# Patient Record
Sex: MALE | Race: WHITE | Employment: OTHER | URBAN - METROPOLITAN AREA
[De-identification: names, ages, dates, MRNs, and addresses within clinical notes are randomized per-mention and may not be internally consistent; named-entity substitution may affect disease eponyms.]

---

## 2020-03-19 ENCOUNTER — HOSPITAL ENCOUNTER (EMERGENCY)
Facility: HOSPITAL | Age: 75
Discharge: HOME | End: 2020-03-19
Attending: EMERGENCY MEDICINE
Payer: MEDICARE

## 2020-03-19 VITALS
WEIGHT: 210 LBS | HEIGHT: 72 IN | BODY MASS INDEX: 28.44 KG/M2 | DIASTOLIC BLOOD PRESSURE: 90 MMHG | OXYGEN SATURATION: 97 % | RESPIRATION RATE: 14 BRPM | TEMPERATURE: 98 F | SYSTOLIC BLOOD PRESSURE: 129 MMHG | HEART RATE: 73 BPM

## 2020-03-19 DIAGNOSIS — J06.9 VIRAL URI: Primary | ICD-10-CM

## 2020-03-19 PROCEDURE — 99282 EMERGENCY DEPT VISIT SF MDM: CPT

## 2020-03-19 RX ORDER — ESCITALOPRAM OXALATE 10 MG/1
10 TABLET ORAL DAILY
COMMUNITY

## 2020-03-19 NOTE — ED PROVIDER NOTES
Patient Seen in: [facility] Emergency Department      History   Patient presents with:  Cough/URI    Stated Complaint: SOB, cough, interest in COVID testing, hx of cancer    HPI    72-year-old male with a history of bladder cancer.   That was diagnos nonfocal       MDM       Patient made aware that he does not meet IDPH criteria for corona virus testing at this time. He will return at the for sign of worsening such as shortness of breath.           Disposition and Plan     Clinical Impression:  [facility]

## 2021-02-10 PROBLEM — D03.39 MELANOMA IN SITU OF NOSE (HCC): Status: ACTIVE | Noted: 2021-02-10

## 2022-11-09 ENCOUNTER — TELEMEDICINE (OUTPATIENT)
Dept: FAMILY MEDICINE CLINIC | Facility: CLINIC | Age: 77
End: 2022-11-09
Payer: MEDICARE

## 2022-11-09 DIAGNOSIS — J06.9 VIRAL URI WITH COUGH: Primary | ICD-10-CM

## 2022-11-09 PROBLEM — D69.2 PURPURA: Status: ACTIVE | Noted: 2022-11-09

## 2022-11-09 PROBLEM — J41.0 SMOKERS' COUGH (HCC): Chronic | Status: ACTIVE | Noted: 2022-11-09

## 2022-11-09 PROBLEM — D69.2 PURPURA (HCC): Status: ACTIVE | Noted: 2022-11-09

## 2022-11-09 PROCEDURE — 99203 OFFICE O/P NEW LOW 30 MIN: CPT | Performed by: STUDENT IN AN ORGANIZED HEALTH CARE EDUCATION/TRAINING PROGRAM

## 2022-11-09 RX ORDER — AZELASTINE 1 MG/ML
2 SPRAY, METERED NASAL NIGHTLY PRN
Qty: 30 ML | Refills: 0 | Status: SHIPPED | OUTPATIENT
Start: 2022-11-09

## 2022-11-09 RX ORDER — FLUTICASONE PROPIONATE 50 MCG
2 SPRAY, SUSPENSION (ML) NASAL DAILY PRN
Qty: 16 G | Refills: 0 | Status: SHIPPED | OUTPATIENT
Start: 2022-11-09

## 2022-11-09 RX ORDER — BENZONATATE 200 MG/1
200 CAPSULE ORAL 3 TIMES DAILY PRN
Qty: 30 CAPSULE | Refills: 0 | Status: SHIPPED | OUTPATIENT
Start: 2022-11-09

## 2022-11-10 ENCOUNTER — LAB ENCOUNTER (OUTPATIENT)
Dept: LAB | Age: 77
End: 2022-11-10
Attending: STUDENT IN AN ORGANIZED HEALTH CARE EDUCATION/TRAINING PROGRAM
Payer: MEDICARE

## 2022-11-10 DIAGNOSIS — J06.9 VIRAL URI WITH COUGH: ICD-10-CM

## 2022-11-11 LAB — SARS-COV-2 RNA RESP QL NAA+PROBE: NOT DETECTED

## 2022-11-28 ENCOUNTER — OFFICE VISIT (OUTPATIENT)
Dept: FAMILY MEDICINE CLINIC | Facility: CLINIC | Age: 77
End: 2022-11-28
Payer: MEDICARE

## 2022-11-28 VITALS
BODY MASS INDEX: 26.68 KG/M2 | SYSTOLIC BLOOD PRESSURE: 104 MMHG | TEMPERATURE: 98 F | WEIGHT: 197 LBS | HEART RATE: 88 BPM | DIASTOLIC BLOOD PRESSURE: 70 MMHG | HEIGHT: 72 IN | RESPIRATION RATE: 16 BRPM | OXYGEN SATURATION: 98 %

## 2022-11-28 DIAGNOSIS — J18.9 PULMONARY INFECTION: Primary | ICD-10-CM

## 2022-11-28 DIAGNOSIS — F41.9 ANXIETY AND DEPRESSION: ICD-10-CM

## 2022-11-28 DIAGNOSIS — F32.A ANXIETY AND DEPRESSION: ICD-10-CM

## 2022-11-28 PROCEDURE — 99213 OFFICE O/P EST LOW 20 MIN: CPT | Performed by: STUDENT IN AN ORGANIZED HEALTH CARE EDUCATION/TRAINING PROGRAM

## 2022-11-28 PROCEDURE — 3008F BODY MASS INDEX DOCD: CPT | Performed by: STUDENT IN AN ORGANIZED HEALTH CARE EDUCATION/TRAINING PROGRAM

## 2022-11-28 PROCEDURE — 3078F DIAST BP <80 MM HG: CPT | Performed by: STUDENT IN AN ORGANIZED HEALTH CARE EDUCATION/TRAINING PROGRAM

## 2022-11-28 PROCEDURE — 3074F SYST BP LT 130 MM HG: CPT | Performed by: STUDENT IN AN ORGANIZED HEALTH CARE EDUCATION/TRAINING PROGRAM

## 2022-11-28 RX ORDER — AMOXICILLIN AND CLAVULANATE POTASSIUM 875; 125 MG/1; MG/1
1 TABLET, FILM COATED ORAL 2 TIMES DAILY
Qty: 14 TABLET | Refills: 0 | Status: SHIPPED | OUTPATIENT
Start: 2022-11-28 | End: 2022-12-05

## 2022-11-28 RX ORDER — AZITHROMYCIN 250 MG/1
TABLET, FILM COATED ORAL
Qty: 6 TABLET | Refills: 0 | Status: SHIPPED | OUTPATIENT
Start: 2022-11-28 | End: 2022-12-03

## 2022-11-28 RX ORDER — VENLAFAXINE 75 MG/1
75 TABLET ORAL 2 TIMES DAILY
Qty: 180 TABLET | Refills: 1 | Status: SHIPPED | OUTPATIENT
Start: 2022-11-28

## 2022-12-01 ENCOUNTER — TELEPHONE (OUTPATIENT)
Dept: FAMILY MEDICINE CLINIC | Facility: CLINIC | Age: 77
End: 2022-12-01

## 2022-12-01 DIAGNOSIS — J06.9 VIRAL URI WITH COUGH: Primary | ICD-10-CM

## 2022-12-01 RX ORDER — AZELASTINE 1 MG/ML
2 SPRAY, METERED NASAL NIGHTLY PRN
Qty: 30 ML | Refills: 0 | Status: SHIPPED | OUTPATIENT
Start: 2022-12-01

## 2022-12-01 RX ORDER — FLUTICASONE PROPIONATE 50 MCG
2 SPRAY, SUSPENSION (ML) NASAL DAILY PRN
Qty: 16 G | Refills: 0 | Status: SHIPPED | OUTPATIENT
Start: 2022-12-01

## 2022-12-01 NOTE — TELEPHONE ENCOUNTER
Received fax from pharmacy for refill of flonase. Flonase refilled.      Ricardo Fonseca MD, 12/01/22, 10:09 AM

## 2022-12-20 DIAGNOSIS — R09.81 NASAL CONGESTION: Primary | ICD-10-CM

## 2022-12-20 RX ORDER — FLUTICASONE PROPIONATE 50 MCG
2 SPRAY, SUSPENSION (ML) NASAL DAILY PRN
Qty: 16 G | Refills: 0 | Status: SHIPPED | OUTPATIENT
Start: 2022-12-20

## 2022-12-20 RX ORDER — AZELASTINE 1 MG/ML
2 SPRAY, METERED NASAL NIGHTLY PRN
Qty: 30 ML | Refills: 0 | Status: SHIPPED | OUTPATIENT
Start: 2022-12-20

## 2022-12-20 NOTE — PROGRESS NOTES
Received fax from [facility] requesting refill of flonase and azelastine, medications refilled.      Guerrero Trejo MD, 12/20/22, 2:06 PM

## 2022-12-22 PROBLEM — R91.8 PULMONARY NODULES: Status: ACTIVE | Noted: 2020-06-01

## 2022-12-22 PROBLEM — M17.11 OSTEOARTHRITIS OF RIGHT KNEE: Status: ACTIVE | Noted: 2021-05-18

## 2022-12-22 PROBLEM — C67.5 MALIGNANT NEOPLASM OF URINARY BLADDER NECK (HCC): Status: ACTIVE | Noted: 2018-03-14

## 2022-12-22 PROBLEM — N20.0 NEPHROLITHIASIS: Status: ACTIVE | Noted: 2018-03-19

## 2022-12-22 PROBLEM — T43.225A SELECTIVE SEROTONIN REUPTAKE INHIBITOR (SSRI) DISCONTINUATION SYNDROME: Status: ACTIVE | Noted: 2020-06-25

## 2022-12-22 PROBLEM — T43.205A SELECTIVE SEROTONIN REUPTAKE INHIBITOR (SSRI) DISCONTINUATION SYNDROME: Status: ACTIVE | Noted: 2020-06-25

## 2022-12-22 PROBLEM — M19.90 OSTEOARTHROSIS: Status: ACTIVE | Noted: 2018-03-19

## 2022-12-22 PROBLEM — F41.9 ANXIETY: Status: ACTIVE | Noted: 2018-02-22

## 2023-06-12 ENCOUNTER — OFFICE VISIT (OUTPATIENT)
Dept: FAMILY MEDICINE CLINIC | Facility: CLINIC | Age: 78
End: 2023-06-12
Payer: MEDICARE

## 2023-06-12 VITALS
WEIGHT: 195 LBS | OXYGEN SATURATION: 97 % | RESPIRATION RATE: 20 BRPM | SYSTOLIC BLOOD PRESSURE: 130 MMHG | HEIGHT: 72 IN | BODY MASS INDEX: 26.41 KG/M2 | HEART RATE: 76 BPM | DIASTOLIC BLOOD PRESSURE: 80 MMHG

## 2023-06-12 DIAGNOSIS — R05.1 ACUTE COUGH: Primary | ICD-10-CM

## 2023-06-12 DIAGNOSIS — J18.9 PULMONARY INFECTION: ICD-10-CM

## 2023-06-12 PROCEDURE — 1170F FXNL STATUS ASSESSED: CPT | Performed by: STUDENT IN AN ORGANIZED HEALTH CARE EDUCATION/TRAINING PROGRAM

## 2023-06-12 PROCEDURE — 1159F MED LIST DOCD IN RCRD: CPT | Performed by: STUDENT IN AN ORGANIZED HEALTH CARE EDUCATION/TRAINING PROGRAM

## 2023-06-12 PROCEDURE — 3079F DIAST BP 80-89 MM HG: CPT | Performed by: STUDENT IN AN ORGANIZED HEALTH CARE EDUCATION/TRAINING PROGRAM

## 2023-06-12 PROCEDURE — 1160F RVW MEDS BY RX/DR IN RCRD: CPT | Performed by: STUDENT IN AN ORGANIZED HEALTH CARE EDUCATION/TRAINING PROGRAM

## 2023-06-12 PROCEDURE — 87637 SARSCOV2&INF A&B&RSV AMP PRB: CPT | Performed by: STUDENT IN AN ORGANIZED HEALTH CARE EDUCATION/TRAINING PROGRAM

## 2023-06-12 PROCEDURE — 99213 OFFICE O/P EST LOW 20 MIN: CPT | Performed by: STUDENT IN AN ORGANIZED HEALTH CARE EDUCATION/TRAINING PROGRAM

## 2023-06-12 PROCEDURE — 3075F SYST BP GE 130 - 139MM HG: CPT | Performed by: STUDENT IN AN ORGANIZED HEALTH CARE EDUCATION/TRAINING PROGRAM

## 2023-06-12 PROCEDURE — 3008F BODY MASS INDEX DOCD: CPT | Performed by: STUDENT IN AN ORGANIZED HEALTH CARE EDUCATION/TRAINING PROGRAM

## 2023-06-12 RX ORDER — ALBUTEROL SULFATE 90 UG/1
2 AEROSOL, METERED RESPIRATORY (INHALATION) EVERY 6 HOURS PRN
Qty: 18 G | Refills: 0 | Status: SHIPPED | OUTPATIENT
Start: 2023-06-12

## 2023-06-12 RX ORDER — AZITHROMYCIN 250 MG/1
TABLET, FILM COATED ORAL
Qty: 6 TABLET | Refills: 0 | Status: SHIPPED | OUTPATIENT
Start: 2023-06-12 | End: 2023-06-17

## 2023-06-13 LAB
FLUAV + FLUBV RNA SPEC NAA+PROBE: NEGATIVE
FLUAV + FLUBV RNA SPEC NAA+PROBE: NEGATIVE
RSV RNA SPEC NAA+PROBE: NEGATIVE
SARS-COV-2 RNA RESP QL NAA+PROBE: NOT DETECTED

## 2023-06-21 ENCOUNTER — TELEPHONE (OUTPATIENT)
Dept: FAMILY MEDICINE CLINIC | Facility: CLINIC | Age: 78
End: 2023-06-21

## 2023-06-21 NOTE — TELEPHONE ENCOUNTER
Cough has gotten better but is still coughing,' he is using nasal spray. Nose and cough discharge not green anymore but still coughing up phlegm. Asking if he can get the Zpak that was discussed at appt to [facility].    Pt aware Dr. Han Reyes not in office today.

## 2023-06-21 NOTE — TELEPHONE ENCOUNTER
Per pharm already p/u abx     Pt denies SOB, denies fever   But feels about the same   [facility]

## 2023-06-22 ENCOUNTER — TELEPHONE (OUTPATIENT)
Dept: FAMILY MEDICINE CLINIC | Facility: CLINIC | Age: 78
End: 2023-06-22

## 2023-06-22 ENCOUNTER — HOSPITAL ENCOUNTER (OUTPATIENT)
Dept: GENERAL RADIOLOGY | Facility: HOSPITAL | Age: 78
Discharge: HOME | End: 2023-06-22
Attending: STUDENT IN AN ORGANIZED HEALTH CARE EDUCATION/TRAINING PROGRAM
Payer: MEDICARE

## 2023-06-22 ENCOUNTER — OFFICE VISIT (OUTPATIENT)
Dept: FAMILY MEDICINE CLINIC | Facility: CLINIC | Age: 78
End: 2023-06-22
Payer: MEDICARE

## 2023-06-22 VITALS
DIASTOLIC BLOOD PRESSURE: 80 MMHG | BODY MASS INDEX: 27.22 KG/M2 | HEART RATE: 65 BPM | RESPIRATION RATE: 19 BRPM | SYSTOLIC BLOOD PRESSURE: 128 MMHG | WEIGHT: 201 LBS | OXYGEN SATURATION: 98 % | HEIGHT: 72 IN

## 2023-06-22 DIAGNOSIS — R05.2 SUBACUTE COUGH: ICD-10-CM

## 2023-06-22 DIAGNOSIS — R05.2 SUBACUTE COUGH: Primary | ICD-10-CM

## 2023-06-22 PROCEDURE — 99213 OFFICE O/P EST LOW 20 MIN: CPT | Performed by: STUDENT IN AN ORGANIZED HEALTH CARE EDUCATION/TRAINING PROGRAM

## 2023-06-22 PROCEDURE — 1160F RVW MEDS BY RX/DR IN RCRD: CPT | Performed by: STUDENT IN AN ORGANIZED HEALTH CARE EDUCATION/TRAINING PROGRAM

## 2023-06-22 PROCEDURE — 3008F BODY MASS INDEX DOCD: CPT | Performed by: STUDENT IN AN ORGANIZED HEALTH CARE EDUCATION/TRAINING PROGRAM

## 2023-06-22 PROCEDURE — 1170F FXNL STATUS ASSESSED: CPT | Performed by: STUDENT IN AN ORGANIZED HEALTH CARE EDUCATION/TRAINING PROGRAM

## 2023-06-22 PROCEDURE — 3074F SYST BP LT 130 MM HG: CPT | Performed by: STUDENT IN AN ORGANIZED HEALTH CARE EDUCATION/TRAINING PROGRAM

## 2023-06-22 PROCEDURE — 71046 X-RAY EXAM CHEST 2 VIEWS: CPT | Performed by: STUDENT IN AN ORGANIZED HEALTH CARE EDUCATION/TRAINING PROGRAM

## 2023-06-22 PROCEDURE — 1159F MED LIST DOCD IN RCRD: CPT | Performed by: STUDENT IN AN ORGANIZED HEALTH CARE EDUCATION/TRAINING PROGRAM

## 2023-06-22 PROCEDURE — 3079F DIAST BP 80-89 MM HG: CPT | Performed by: STUDENT IN AN ORGANIZED HEALTH CARE EDUCATION/TRAINING PROGRAM

## 2023-06-22 RX ORDER — IPRATROPIUM BROMIDE 42 UG/1
2 SPRAY, METERED NASAL 3 TIMES DAILY PRN
Qty: 15 ML | Refills: 0 | Status: SHIPPED | OUTPATIENT
Start: 2023-06-22

## 2023-08-13 ENCOUNTER — TELEPHONE (OUTPATIENT)
Dept: FAMILY MEDICINE CLINIC | Facility: CLINIC | Age: 78
End: 2023-08-13

## 2023-09-02 DIAGNOSIS — F41.9 ANXIETY AND DEPRESSION: ICD-10-CM

## 2023-09-02 DIAGNOSIS — F32.A ANXIETY AND DEPRESSION: ICD-10-CM

## 2023-09-02 RX ORDER — VENLAFAXINE 75 MG/1
75 TABLET ORAL 2 TIMES DAILY
Qty: 180 TABLET | Refills: 1 | Status: SHIPPED | OUTPATIENT
Start: 2023-09-02

## 2023-09-03 ENCOUNTER — HOSPITAL ENCOUNTER (EMERGENCY)
Facility: HOSPITAL | Age: 78
Discharge: HOME | End: 2023-09-03
Attending: EMERGENCY MEDICINE
Payer: MEDICARE

## 2023-09-03 VITALS
TEMPERATURE: 97 F | HEART RATE: 95 BPM | OXYGEN SATURATION: 96 % | DIASTOLIC BLOOD PRESSURE: 93 MMHG | BODY MASS INDEX: 27.09 KG/M2 | HEIGHT: 72 IN | WEIGHT: 200 LBS | RESPIRATION RATE: 14 BRPM | SYSTOLIC BLOOD PRESSURE: 150 MMHG

## 2023-09-03 DIAGNOSIS — J02.0 STREPTOCOCCAL SORE THROAT: Primary | ICD-10-CM

## 2023-09-03 LAB — SARS-COV-2 RNA RESP QL NAA+PROBE: NOT DETECTED

## 2023-09-03 PROCEDURE — 87430 STREP A AG IA: CPT | Performed by: EMERGENCY MEDICINE

## 2023-09-03 PROCEDURE — 99283 EMERGENCY DEPT VISIT LOW MDM: CPT

## 2023-09-03 PROCEDURE — 87430 STREP A AG IA: CPT

## 2023-09-03 RX ORDER — AMOXICILLIN AND CLAVULANATE POTASSIUM 875; 125 MG/1; MG/1
1 TABLET, FILM COATED ORAL 2 TIMES DAILY
Qty: 20 TABLET | Refills: 0 | Status: SHIPPED | OUTPATIENT
Start: 2023-09-03 | End: 2023-09-13

## 2024-02-20 ENCOUNTER — TELEPHONE (OUTPATIENT)
Dept: FAMILY MEDICINE CLINIC | Facility: CLINIC | Age: 79
End: 2024-02-20

## 2024-02-20 ENCOUNTER — OFFICE VISIT (OUTPATIENT)
Dept: FAMILY MEDICINE CLINIC | Facility: CLINIC | Age: 79
End: 2024-02-20
Payer: COMMERCIAL

## 2024-02-20 VITALS
SYSTOLIC BLOOD PRESSURE: 128 MMHG | RESPIRATION RATE: 16 BRPM | BODY MASS INDEX: 28.22 KG/M2 | HEIGHT: 72 IN | HEART RATE: 71 BPM | DIASTOLIC BLOOD PRESSURE: 74 MMHG | WEIGHT: 208.38 LBS | OXYGEN SATURATION: 97 %

## 2024-02-20 DIAGNOSIS — R10.13 EPIGASTRIC PAIN: Primary | ICD-10-CM

## 2024-02-20 LAB
ALBUMIN SERPL-MCNC: 3.6 G/DL (ref 3.4–5)
ALBUMIN/GLOB SERPL: 0.9 {RATIO} (ref 1–2)
ALP LIVER SERPL-CCNC: 92 U/L
ALT SERPL-CCNC: 29 U/L
ANION GAP SERPL CALC-SCNC: 9 MMOL/L (ref 0–18)
AST SERPL-CCNC: 35 U/L (ref 15–37)
BASOPHILS # BLD AUTO: 0.07 X10(3) UL (ref 0–0.2)
BASOPHILS NFR BLD AUTO: 1 %
BILIRUB SERPL-MCNC: 0.3 MG/DL (ref 0.1–2)
BUN BLD-MCNC: 14 MG/DL (ref 9–23)
CALCIUM BLD-MCNC: 9.4 MG/DL (ref 8.5–10.1)
CHLORIDE SERPL-SCNC: 109 MMOL/L (ref 98–112)
CO2 SERPL-SCNC: 20 MMOL/L (ref 21–32)
CREAT BLD-MCNC: 1.25 MG/DL
EGFRCR SERPLBLD CKD-EPI 2021: 59 ML/MIN/1.73M2 (ref 60–?)
EOSINOPHIL # BLD AUTO: 0.49 X10(3) UL (ref 0–0.7)
EOSINOPHIL NFR BLD AUTO: 7.1 %
ERYTHROCYTE [DISTWIDTH] IN BLOOD BY AUTOMATED COUNT: 13.1 %
FASTING STATUS PATIENT QL REPORTED: NO
GLOBULIN PLAS-MCNC: 4.2 G/DL (ref 2.8–4.4)
GLUCOSE BLD-MCNC: 63 MG/DL (ref 70–99)
HCT VFR BLD AUTO: 44.1 %
HGB BLD-MCNC: 14.7 G/DL
IMM GRANULOCYTES # BLD AUTO: 0.02 X10(3) UL (ref 0–1)
IMM GRANULOCYTES NFR BLD: 0.3 %
LIPASE SERPL-CCNC: 41 U/L (ref ?–300)
LYMPHOCYTES # BLD AUTO: 1.72 X10(3) UL (ref 1–4)
LYMPHOCYTES NFR BLD AUTO: 24.9 %
MCH RBC QN AUTO: 33.9 PG (ref 26–34)
MCHC RBC AUTO-ENTMCNC: 33.3 G/DL (ref 31–37)
MCV RBC AUTO: 101.8 FL
MONOCYTES # BLD AUTO: 0.95 X10(3) UL (ref 0.1–1)
MONOCYTES NFR BLD AUTO: 13.7 %
NEUTROPHILS # BLD AUTO: 3.66 X10 (3) UL (ref 1.5–7.7)
NEUTROPHILS # BLD AUTO: 3.66 X10(3) UL (ref 1.5–7.7)
NEUTROPHILS NFR BLD AUTO: 53 %
OSMOLALITY SERPL CALC.SUM OF ELEC: 285 MOSM/KG (ref 275–295)
PLATELET # BLD AUTO: 347 10(3)UL (ref 150–450)
POTASSIUM SERPL-SCNC: 4.4 MMOL/L (ref 3.5–5.1)
PROT SERPL-MCNC: 7.8 G/DL (ref 6.4–8.2)
RBC # BLD AUTO: 4.33 X10(6)UL
SODIUM SERPL-SCNC: 138 MMOL/L (ref 136–145)
TROPONIN I SERPL HS-MCNC: 4 NG/L
WBC # BLD AUTO: 6.9 X10(3) UL (ref 4–11)

## 2024-02-20 PROCEDURE — 3074F SYST BP LT 130 MM HG: CPT | Performed by: STUDENT IN AN ORGANIZED HEALTH CARE EDUCATION/TRAINING PROGRAM

## 2024-02-20 PROCEDURE — 1160F RVW MEDS BY RX/DR IN RCRD: CPT | Performed by: STUDENT IN AN ORGANIZED HEALTH CARE EDUCATION/TRAINING PROGRAM

## 2024-02-20 PROCEDURE — 3078F DIAST BP <80 MM HG: CPT | Performed by: STUDENT IN AN ORGANIZED HEALTH CARE EDUCATION/TRAINING PROGRAM

## 2024-02-20 PROCEDURE — 83690 ASSAY OF LIPASE: CPT | Performed by: STUDENT IN AN ORGANIZED HEALTH CARE EDUCATION/TRAINING PROGRAM

## 2024-02-20 PROCEDURE — 85025 COMPLETE CBC W/AUTO DIFF WBC: CPT | Performed by: STUDENT IN AN ORGANIZED HEALTH CARE EDUCATION/TRAINING PROGRAM

## 2024-02-20 PROCEDURE — 99213 OFFICE O/P EST LOW 20 MIN: CPT | Performed by: STUDENT IN AN ORGANIZED HEALTH CARE EDUCATION/TRAINING PROGRAM

## 2024-02-20 PROCEDURE — 84484 ASSAY OF TROPONIN QUANT: CPT | Performed by: STUDENT IN AN ORGANIZED HEALTH CARE EDUCATION/TRAINING PROGRAM

## 2024-02-20 PROCEDURE — 80053 COMPREHEN METABOLIC PANEL: CPT | Performed by: STUDENT IN AN ORGANIZED HEALTH CARE EDUCATION/TRAINING PROGRAM

## 2024-02-20 PROCEDURE — 1170F FXNL STATUS ASSESSED: CPT | Performed by: STUDENT IN AN ORGANIZED HEALTH CARE EDUCATION/TRAINING PROGRAM

## 2024-02-20 PROCEDURE — 1159F MED LIST DOCD IN RCRD: CPT | Performed by: STUDENT IN AN ORGANIZED HEALTH CARE EDUCATION/TRAINING PROGRAM

## 2024-02-20 PROCEDURE — 3008F BODY MASS INDEX DOCD: CPT | Performed by: STUDENT IN AN ORGANIZED HEALTH CARE EDUCATION/TRAINING PROGRAM

## 2024-02-20 RX ORDER — PANTOPRAZOLE SODIUM 40 MG/1
40 TABLET, DELAYED RELEASE ORAL
Qty: 30 TABLET | Refills: 0 | Status: SHIPPED | OUTPATIENT
Start: 2024-02-20

## 2024-02-20 NOTE — TELEPHONE ENCOUNTER
Please inform patient that I sent pantoprazole to his pharmacy to help with his abdominal symptoms (a medication to help reduce acidity in the stomach which may be contributing to his symptoms). Ok for him to take this medication now even while we are waiting for his labs and other results to come in.     Tevin Kiser MD, 02/20/24, 1:38 PM

## 2024-02-20 NOTE — TELEPHONE ENCOUNTER
Detailed vm left for pt re pantoprazole prescription sent to [facility] and Dr Kiser's recommendations, call back with questions.

## 2024-02-20 NOTE — PROGRESS NOTES
[facility] Family Medicine Office Note    HPI:     Cruz Krishnan is a 78 year old male presenting for abdominal pain episodes.    Had 3 episodes in last month (one about a month ago, another in last week, and latest one a few days ago).   Denies any changes to diet.   States he feels sensation of \"knotting\" in epigastric region.   Denies swelling in epigastric region.   Burping apparently eliminated some of the pressure/pain sensation.   With most recent episode had NBNB.   Denies fevers or chills.   Had some constipation off and on over last couple of months but manageable.   States that his most recent episode of pain was severe enough that he almost had to go to the ER, but eventually self-resolved.    Had SOB with this latest episode, may have been due to severity of pain (did not have SOB with other 2 episodes).   Had CT chest/abdomen/ pelvis in December 2023 (surveillance testing given his hx of urothelial cancer) which was relatively unremarkable (but did re-demonstrate \"large hiatal hernia\" that patient has history of).     HISTORY:  Past Medical History:   Diagnosis Date    Cancer (HCC)       Past Surgical History:   Procedure Laterality Date    CATARACTS, OPHTH (INTERNAL)      SKIN SURGERY  02/22/2021    En face stage 1 - right nasal ala -Dr. GRACE Felix       History reviewed. No pertinent family history.   Social History:   Social History     Socioeconomic History    Marital status:    Tobacco Use    Smoking status: Former     Passive exposure: Past    Smokeless tobacco: Never   Vaping Use    Vaping Use: Never used   Substance and Sexual Activity    Alcohol use: Yes     Alcohol/week: 1.0 standard drink of alcohol     Types: 1 Glasses of wine per week     Comment: 1 every couple days    Drug use: Never        Medications (Active prior to today's visit):  Current Outpatient Medications   Medication Sig Dispense Refill    pantoprazole 40 MG Oral Tab EC Take 1 tablet (40 mg total) by mouth  every morning before breakfast. 30 tablet 0    venlafaxine 75 MG Oral Tab Take 1 tablet (75 mg total) by mouth 2 (two) times daily. 180 tablet 1    ipratropium 0.06 % Nasal Solution 2 sprays by Nasal route 3 (three) times daily as needed. 15 mL 0    albuterol 108 (90 Base) MCG/ACT Inhalation Aero Soln Inhale 2 puffs into the lungs every 6 (six) hours as needed. 18 g 0       Allergies:  No Known Allergies      ROS:   Review of Systems   Gastrointestinal:  Positive for abdominal pain. Negative for diarrhea, nausea and vomiting.        +off and on constipation     Otherwise see HPI    PHYSICAL EXAM:   /74 (BP Location: Left arm, Patient Position: Sitting, Cuff Size: adult)   Pulse 71   Resp 16   Ht 6' (1.829 m)   Wt 208 lb 6.4 oz (94.5 kg)   SpO2 97%   BMI 28.26 kg/m²  Estimated body mass index is 28.26 kg/m² as calculated from the following:    Height as of this encounter: 6' (1.829 m).    Weight as of this encounter: 208 lb 6.4 oz (94.5 kg).   Vital signs reviewed.Appears stated age, well groomed.    Physical Exam      ASSESSMENT/PLAN:     78 year old male presenting for abdominal pain episodes.    1. Epigastric pain  - Troponin I (High Sensitivity); Future  - Comp Metabolic Panel (14) [E]; Future  - Lipase [E]; Future  - CBC W Differential W Platelet [E]; Future  - start with blood work above and trial of pantoprazole and dietary handout given for acid reflux   - if blood work unrevealing proceed with EKG and CT scan below   - EKG 12 Lead performed at [facility]; Future  - CT ABDOMEN (W+WO) (CPT=74170); Future  - pantoprazole 40 MG Oral Tab EC; Take 1 tablet (40 mg total) by mouth every morning before breakfast.  Dispense: 30 tablet; Refill: 0    Follow-up: as needed    Outcome: Patient verbalizes understanding. Patient is notified to call with any questions, complications, allergies, or worsening or changing symptoms.  Patient is to call with any side effects or complications from the treatments as  a result of today.     Total length of visit/charting: approximately 25 min    Tevin Kiser MD, 02/20/24, 11:11 AM      Please note that portions of this note may have been completed with a voice recognition program. Efforts were made to edit the dictations but occasionally words are mis-transcribed.

## 2024-02-21 ENCOUNTER — PATIENT MESSAGE (OUTPATIENT)
Dept: FAMILY MEDICINE CLINIC | Facility: CLINIC | Age: 79
End: 2024-02-21

## 2024-02-21 NOTE — TELEPHONE ENCOUNTER
From: Cruz Krishnan  To: Tevin Kiser  Sent: 2/21/2024 1:28 PM CST  Subject: Cruz Krishnan Test Results    Dr. Kiser,  At your convenience please let me know your thoughts regarding the results of the blood tests done yesterday, as well as whether I should schedule the CT and EKG.    Thank you,  Ron Krishnan  [phone]

## 2024-02-28 DIAGNOSIS — F32.A ANXIETY AND DEPRESSION: ICD-10-CM

## 2024-02-28 DIAGNOSIS — F41.9 ANXIETY AND DEPRESSION: ICD-10-CM

## 2024-02-28 RX ORDER — VENLAFAXINE 75 MG/1
75 TABLET ORAL 2 TIMES DAILY
Qty: 180 TABLET | Refills: 0 | Status: SHIPPED | OUTPATIENT
Start: 2024-02-28

## 2024-03-04 ENCOUNTER — TELEPHONE (OUTPATIENT)
Dept: FAMILY MEDICINE CLINIC | Facility: CLINIC | Age: 79
End: 2024-03-04

## 2024-03-04 NOTE — TELEPHONE ENCOUNTER
Ayleen in  CT called to say  their protocol is usually do CT Abd with contrast only and  not without.   Department will change order to CT Abomen with contrast.

## 2024-03-05 ENCOUNTER — HOSPITAL ENCOUNTER (OUTPATIENT)
Dept: CT IMAGING | Facility: HOSPITAL | Age: 79
Discharge: HOME | End: 2024-03-05
Attending: STUDENT IN AN ORGANIZED HEALTH CARE EDUCATION/TRAINING PROGRAM
Payer: MEDICARE

## 2024-03-05 ENCOUNTER — EKG ENCOUNTER (OUTPATIENT)
Dept: LAB | Facility: HOSPITAL | Age: 79
End: 2024-03-05
Attending: STUDENT IN AN ORGANIZED HEALTH CARE EDUCATION/TRAINING PROGRAM
Payer: MEDICARE

## 2024-03-05 DIAGNOSIS — R10.13 EPIGASTRIC PAIN: ICD-10-CM

## 2024-03-05 PROCEDURE — 93005 ELECTROCARDIOGRAM TRACING: CPT

## 2024-03-05 PROCEDURE — 74160 CT ABDOMEN W/CONTRAST: CPT | Performed by: STUDENT IN AN ORGANIZED HEALTH CARE EDUCATION/TRAINING PROGRAM

## 2024-03-05 PROCEDURE — 93010 ELECTROCARDIOGRAM REPORT: CPT | Performed by: INTERNAL MEDICINE

## 2024-03-06 LAB
ATRIAL RATE: 67 BPM
P AXIS: 66 DEGREES
P-R INTERVAL: 180 MS
Q-T INTERVAL: 386 MS
QRS DURATION: 110 MS
QTC CALCULATION (BEZET): 407 MS
R AXIS: 23 DEGREES
T AXIS: 36 DEGREES
VENTRICULAR RATE: 67 BPM

## 2024-03-08 DIAGNOSIS — K44.9 HIATAL HERNIA: Primary | ICD-10-CM

## 2024-03-18 DIAGNOSIS — R10.13 EPIGASTRIC PAIN: Primary | ICD-10-CM

## 2024-03-18 RX ORDER — PANTOPRAZOLE SODIUM 40 MG/1
40 TABLET, DELAYED RELEASE ORAL
Qty: 90 TABLET | Refills: 0 | Status: SHIPPED | OUTPATIENT
Start: 2024-03-18

## 2024-06-06 DIAGNOSIS — F32.A ANXIETY AND DEPRESSION: ICD-10-CM

## 2024-06-06 DIAGNOSIS — F41.9 ANXIETY AND DEPRESSION: ICD-10-CM

## 2024-06-06 RX ORDER — VENLAFAXINE 75 MG/1
75 TABLET ORAL 2 TIMES DAILY
Qty: 180 TABLET | Refills: 0 | Status: SHIPPED | OUTPATIENT
Start: 2024-06-06

## 2024-06-06 NOTE — TELEPHONE ENCOUNTER
Last office visit: 02/20/24   Protocol: pass    Requested medication(s) are due for refill today: Yes    Requested medication(s) are on the active medication list same strength, form, dose/ sig: Yes    Requested medication(s) are managed by provider: Yes    Patient has already received a courtsey refill: No    NOV: NONE  Last Labs: 02/20/2024  Asked to Return: PRN

## 2024-06-15 DIAGNOSIS — R10.13 EPIGASTRIC PAIN: ICD-10-CM

## 2024-06-17 RX ORDER — PANTOPRAZOLE SODIUM 40 MG/1
40 TABLET, DELAYED RELEASE ORAL
Qty: 90 TABLET | Refills: 0 | Status: SHIPPED | OUTPATIENT
Start: 2024-06-17

## 2024-07-09 ENCOUNTER — TELEPHONE (OUTPATIENT)
Dept: FAMILY MEDICINE CLINIC | Facility: CLINIC | Age: 79
End: 2024-07-09

## 2024-07-09 DIAGNOSIS — M79.604 RIGHT LEG PAIN: Primary | ICD-10-CM

## 2024-07-09 RX ORDER — METHYLPREDNISOLONE 4 MG/1
TABLET ORAL
Qty: 21 EACH | Refills: 0 | Status: SHIPPED | OUTPATIENT
Start: 2024-07-09

## 2024-07-09 NOTE — TELEPHONE ENCOUNTER
Patient is having really bad sciatica and is wondering if we can refer or assist them in getting into pain management dr newsome.

## 2024-07-09 NOTE — TELEPHONE ENCOUNTER
Per wife   Pain in the R hip to ankle, constant, aching  3-4 days   Moving slow and limited but not debilitated  Up and moving   No fall/trauma   Took old Rx Lyrica and Tylenol but no relief  No redness , no swelling  No reported numbness and tingling  Has a Pain specialist from [place]  that gives pt \"a shot\"   But no open slot until October   Advised that if with debilitating/severe pain to  go to the ER   But she refused this idea d/t cost  She (wife) is asking for another pain specialist referral   RN advised that an immediate opening is not assured for the new specialist and waiting is likely   Advocate ER if with severe pain, not delay treatment  if so   And might need an updated imaging   Wife terminated the call    Fyi and addl recomm if any

## 2024-07-09 NOTE — TELEPHONE ENCOUNTER
I placed a pain management referral to Dr. Ha. While it may not help as much as the steroid shot that patient gets, I prescribed an oral steroid pack (Medrol) to patient's pharmacy on file while he awaits to see the pain specialist. Please notify patient.     Tevin Kiser MD, 07/09/24, 2:34 PM

## 2024-07-15 ENCOUNTER — OFFICE VISIT (OUTPATIENT)
Dept: FAMILY MEDICINE CLINIC | Facility: CLINIC | Age: 79
End: 2024-07-15
Payer: COMMERCIAL

## 2024-07-15 VITALS
BODY MASS INDEX: 27.38 KG/M2 | HEIGHT: 72 IN | SYSTOLIC BLOOD PRESSURE: 142 MMHG | HEART RATE: 66 BPM | DIASTOLIC BLOOD PRESSURE: 70 MMHG | RESPIRATION RATE: 18 BRPM | OXYGEN SATURATION: 97 % | WEIGHT: 202.13 LBS

## 2024-07-15 DIAGNOSIS — M54.31 RIGHT SIDED SCIATICA: Primary | ICD-10-CM

## 2024-07-15 PROCEDURE — 1160F RVW MEDS BY RX/DR IN RCRD: CPT | Performed by: STUDENT IN AN ORGANIZED HEALTH CARE EDUCATION/TRAINING PROGRAM

## 2024-07-15 PROCEDURE — 3077F SYST BP >= 140 MM HG: CPT | Performed by: STUDENT IN AN ORGANIZED HEALTH CARE EDUCATION/TRAINING PROGRAM

## 2024-07-15 PROCEDURE — 1159F MED LIST DOCD IN RCRD: CPT | Performed by: STUDENT IN AN ORGANIZED HEALTH CARE EDUCATION/TRAINING PROGRAM

## 2024-07-15 PROCEDURE — 1125F AMNT PAIN NOTED PAIN PRSNT: CPT | Performed by: STUDENT IN AN ORGANIZED HEALTH CARE EDUCATION/TRAINING PROGRAM

## 2024-07-15 PROCEDURE — 3078F DIAST BP <80 MM HG: CPT | Performed by: STUDENT IN AN ORGANIZED HEALTH CARE EDUCATION/TRAINING PROGRAM

## 2024-07-15 PROCEDURE — 1170F FXNL STATUS ASSESSED: CPT | Performed by: STUDENT IN AN ORGANIZED HEALTH CARE EDUCATION/TRAINING PROGRAM

## 2024-07-15 PROCEDURE — 99214 OFFICE O/P EST MOD 30 MIN: CPT | Performed by: STUDENT IN AN ORGANIZED HEALTH CARE EDUCATION/TRAINING PROGRAM

## 2024-07-15 PROCEDURE — 3008F BODY MASS INDEX DOCD: CPT | Performed by: STUDENT IN AN ORGANIZED HEALTH CARE EDUCATION/TRAINING PROGRAM

## 2024-07-15 RX ORDER — PREDNISONE 20 MG/1
TABLET ORAL
Qty: 11 TABLET | Refills: 0 | Status: SHIPPED | OUTPATIENT
Start: 2024-07-15 | End: 2024-07-24

## 2024-07-15 NOTE — PROGRESS NOTES
[facility] Family Medicine Office Note    HPI:     Cruz Krishnan is a 79 year old male presenting for right-sided sciatica.     Recently prescribed medrol for this, states this only helped about \"30%.\" He states he has had recurrent issus with right-sided sciatica in past, endorses hx of herniated disc. Up until recently he would see pain management at [facility] for steroid injections but needs new referral for more local pain specialist (referral placed to Dr. Ha, patient has not made appointment yet as he was not aware of this referral being in the system).     Pain currently can limits sleep and patient endorses he has to use a cane to help with walking. Has to sleep in chair to help with pain.     HISTORY:  Past Medical History:    Cancer (HCC)      Past Surgical History:   Procedure Laterality Date    Cataracts, ophth (internal)      Skin surgery  02/22/2021    En face stage 1 - right nasal ala -Dr. GRACE Felix       No family history on file.   Social History:   Social History     Socioeconomic History    Marital status:    Tobacco Use    Smoking status: Former     Passive exposure: Past    Smokeless tobacco: Never   Vaping Use    Vaping status: Never Used   Substance and Sexual Activity    Alcohol use: Yes     Alcohol/week: 1.0 standard drink of alcohol     Types: 1 Glasses of wine per week     Comment: 1 every couple days    Drug use: Never     Social Determinants of Health     Food Insecurity: Low Risk  (6/1/2022)    Received from [facility]    Food Insecurity     Have there been times that your food ran out, and you didn't have money to get more?: No     Are there times that you worry that this might happen?: No   Transportation Needs: Low Risk  (6/1/2022)    Received from [facility]    Transportation Needs     Do you have trouble getting transportation to medical appointments?:  No    [facility]    Social Connections   Housing Stability: Low Risk  (6/1/2022)    [facility]    Housing Stability     Are you concerned about having a safe and reliable place to live?: No        Medications (Active prior to today's visit):  Current Outpatient Medications   Medication Sig Dispense Refill    predniSONE 20 MG Oral Tab Take 2 tablets (40 mg total) by mouth daily for 3 days, THEN 1 tablet (20 mg total) daily for 3 days, THEN 0.5 tablets (10 mg total) daily for 3 days. 11 tablet 0    methylPREDNISolone (MEDROL) 4 MG Oral Tablet Therapy Pack As directed. 21 each 0    PANTOPRAZOLE 40 MG Oral Tab EC TAKE 1 TABLET BY MOUTH EVERY DAY IN THE MORNING BEFORE BREAKFAST 90 tablet 0    VENLAFAXINE 75 MG Oral Tab TAKE 1 TABLET BY MOUTH TWICE A  tablet 0    ipratropium 0.06 % Nasal Solution 2 sprays by Nasal route 3 (three) times daily as needed. 15 mL 0    albuterol 108 (90 Base) MCG/ACT Inhalation Aero Soln Inhale 2 puffs into the lungs every 6 (six) hours as needed. 18 g 0       Allergies:  No Known Allergies      ROS:   Review of Systems   Musculoskeletal:         +right-sided sciatica      Otherwise see HPI    PHYSICAL EXAM:   /70 (BP Location: Left arm, Patient Position: Sitting, Cuff Size: adult)   Pulse 66   Resp 18   Ht 6' (1.829 m)   Wt 202 lb 2 oz (91.7 kg)   SpO2 97%   BMI 27.41 kg/m²  Estimated body mass index is 27.41 kg/m² as calculated from the following:    Height as of this encounter: 6' (1.829 m).    Weight as of this encounter: 202 lb 2 oz (91.7 kg).   Vital signs reviewed.Appears stated age, well groomed.    Physical Exam  Constitutional:       General: He is not in acute distress.  Musculoskeletal:      Comments: +tenderness to palpation in right lower lumbar region   Neurological:      Mental Status: He is alert.           ASSESSMENT/PLAN:      79 year old male presenting for right-sided sciatica.     1. Right sided sciatica  - trial of prednisone below  - provided home physical therapy exercise handout   - predniSONE 20 MG Oral Tab; Take 2 tablets (40 mg total) by mouth daily for 3 days, THEN 1 tablet (20 mg total) daily for 3 days, THEN 0.5 tablets (10 mg total) daily for 3 days.  Dispense: 11 tablet; Refill: 0  - patient to f/u with pain management (referral provided), if wait too long for pain specialist can also try contacting physiatry below  - Physiatry Referral - In Network    Follow-up: encouraged to schedule annual later this year     Outcome: Patient verbalizes understanding. Patient is notified to call with any questions, complications, allergies, or worsening or changing symptoms.  Patient is to call with any side effects or complications from the treatments as a result of today.     Total length of visit/charting: approximately 30 min    Tevin Kiser MD, 07/15/24, 11:18 AM      Please note that portions of this note may have been completed with a voice recognition program. Efforts were made to edit the dictations but occasionally words are mis-transcribed.

## 2024-07-24 ENCOUNTER — OFFICE VISIT (OUTPATIENT)
Dept: PAIN CLINIC | Facility: CLINIC | Age: 79
End: 2024-07-24
Payer: COMMERCIAL

## 2024-07-24 VITALS
DIASTOLIC BLOOD PRESSURE: 86 MMHG | BODY MASS INDEX: 27 KG/M2 | HEART RATE: 68 BPM | SYSTOLIC BLOOD PRESSURE: 142 MMHG | WEIGHT: 202 LBS | OXYGEN SATURATION: 98 %

## 2024-07-24 DIAGNOSIS — M54.16 LUMBAR RADICULITIS: Primary | ICD-10-CM

## 2024-07-24 PROBLEM — N18.30 CKD (CHRONIC KIDNEY DISEASE) STAGE 3, GFR 30-59 ML/MIN (HCC): Chronic | Status: ACTIVE | Noted: 2024-07-24

## 2024-07-24 PROCEDURE — 99204 OFFICE O/P NEW MOD 45 MIN: CPT | Performed by: ANESTHESIOLOGY

## 2024-07-24 PROCEDURE — 1159F MED LIST DOCD IN RCRD: CPT | Performed by: ANESTHESIOLOGY

## 2024-07-24 PROCEDURE — 3077F SYST BP >= 140 MM HG: CPT | Performed by: ANESTHESIOLOGY

## 2024-07-24 PROCEDURE — 3079F DIAST BP 80-89 MM HG: CPT | Performed by: ANESTHESIOLOGY

## 2024-07-24 PROCEDURE — 1160F RVW MEDS BY RX/DR IN RCRD: CPT | Performed by: ANESTHESIOLOGY

## 2024-07-24 RX ORDER — GABAPENTIN 100 MG/1
100 CAPSULE ORAL 3 TIMES DAILY
Qty: 90 CAPSULE | Refills: 0 | Status: SHIPPED | OUTPATIENT
Start: 2024-07-24

## 2024-07-24 RX ORDER — METHYLPREDNISOLONE 4 MG/1
TABLET ORAL
Qty: 1 EACH | Refills: 0 | Status: SHIPPED | OUTPATIENT
Start: 2024-07-24

## 2024-07-24 NOTE — PROGRESS NOTES
Subjective:   Patient ID: Cruz Krishnan is a 79 year old male.    HPI    History/Other:   Review of Systems  Current Outpatient Medications   Medication Sig Dispense Refill   • predniSONE 20 MG Oral Tab Take 2 tablets (40 mg total) by mouth daily for 3 days, THEN 1 tablet (20 mg total) daily for 3 days, THEN 0.5 tablets (10 mg total) daily for 3 days. 11 tablet 0   • methylPREDNISolone (MEDROL) 4 MG Oral Tablet Therapy Pack As directed. 21 each 0   • PANTOPRAZOLE 40 MG Oral Tab EC TAKE 1 TABLET BY MOUTH EVERY DAY IN THE MORNING BEFORE BREAKFAST 90 tablet 0   • VENLAFAXINE 75 MG Oral Tab TAKE 1 TABLET BY MOUTH TWICE A  tablet 0   • ipratropium 0.06 % Nasal Solution 2 sprays by Nasal route 3 (three) times daily as needed. 15 mL 0   • albuterol 108 (90 Base) MCG/ACT Inhalation Aero Soln Inhale 2 puffs into the lungs every 6 (six) hours as needed. 18 g 0     Allergies:No Known Allergies    Objective:   Physical Exam  Constitutional:          Assessment & Plan:   No diagnosis found.    No orders of the defined types were placed in this encounter.      Meds This Visit:  Requested Prescriptions      No prescriptions requested or ordered in this encounter       Imaging & Referrals:  None      Location of Pain: right side sciatica    Date Pain Began: 07/08/24          Work Related:   No        Receiving Work Comp/Disability:   No    Numeric Rating Scale:  Pain at Present:  6                                                                                                            (No Pain) 0  to  10 (Worst Pain)                 Minimum Pain:   3  Maximum Pain  7    Distribution of Pain:    right    Quality of Pain:   aching and sharp/stabbing    Origin of Pain:    Degenerative    Aggravating Factors:    Sitting, Standing, and Walking    Past Treatments for Current Pain Condition:   Other steroids    Prior diagnostic testing for your pain:  none

## 2024-07-24 NOTE — H&P
Name: Cruz Krishnan   : 1945   DOS: 2024     Chief complaint: Low back      History of present illness:  Cruz Krishnan is a 79 year old male with a history of melanoma, urinary bladder neoplasm, chronic kidney disease who presents today for evaluation of low back pain with right-sided radicular symptoms.  Patient has a longstanding history of lumbar radiculitis.  This is treated at [facility] with epidural injections with excellent relief.  Last injection record was from  reviewed.  This provided greater than 80% improvement in symptoms.    Past Medical History:    Cancer (HCC)      Current Outpatient Medications   Medication Sig Dispense Refill    methylPREDNISolone (MEDROL) 4 MG Oral Tablet Therapy Pack Take as directed 1 each 0    ipratropium 0.06 % Nasal Solution 2 sprays by Nasal route 3 (three) times daily as needed. 15 mL 0    albuterol 108 (90 Base) MCG/ACT Inhalation Aero Soln Inhale 2 puffs into the lungs every 6 (six) hours as needed. 18 g 0    PANTOPRAZOLE 40 MG Oral Tab EC TAKE 1 TABLET BY MOUTH EVERY DAY IN THE MORNING BEFORE BREAKFAST 90 tablet 0    VENLAFAXINE 75 MG Oral Tab TAKE 1 TABLET BY MOUTH TWICE A  tablet 0     Past Surgical History:   Procedure Laterality Date    Cataracts, ophth (internal)      Skin surgery  2021    En face stage 1 - right nasal ala -Dr. GRACE Felix       History reviewed. No pertinent family history.  Social History     Socioeconomic History    Marital status:    Tobacco Use    Smoking status: Former     Passive exposure: Past    Smokeless tobacco: Never   Vaping Use    Vaping status: Never Used   Substance and Sexual Activity    Alcohol use: Yes     Alcohol/week: 1.0 standard drink of alcohol     Types: 1 Glasses of wine per week     Comment: 1 every couple days    Drug use: Never     Social Determinants of Health     Food Insecurity: Low Risk  (2022)    [facility]    Food Insecurity     Have there been times that your food ran out, and you didn't have money to get more?: No     Are there times that you worry that this might happen?: No   Transportation Needs: Low Risk  (6/1/2022)    Received from [facility]    Transportation Needs     Do you have trouble getting transportation to medical appointments?: No    [facility] from [facility], [facility]    Social Connections   Housing Stability: Low Risk  (6/1/2022)    Received from [facility]    Housing Stability     Are you concerned about having a safe and reliable place to live?: No       Review of  other systems:  10 point negative    Physical examination: Cruz is a 79 year old male not in acute distress  /86   Pulse 68   Wt 202 lb (91.6 kg)   SpO2 98%   BMI 27.40 kg/m²    The patient is awake, alert, oriented and corporative. He has a normal affect. The patient ambulates with normal gait using cane.  HEENT: No gross lesion noted. PEERL. No icterus.  Neck and Upper Extremity: Supple. No thyromegaly or lymphadenopathy.  Upper EXTR range of motion intact cardiovascular system: Regular rate and rhythm   Respiratory system: Breath sounds equal bilaterally   Abdomen: Soft   Neurologic:  Cranial nerves II through XII are grossly intact.       Examination of the lower back:     Motor Examination:   (R)   (L)   Hip Abduction:   5    5   Hip Flexion:    5    5   Knee Extension:   5    5   Knee Flexion:    5    5   Ant. Tibialis:    5    5  Extensor Hallucis Longus:   5    5  Peroneals:     5    5  Gastrocsoleus:     5    5       Radiology diagnostic studies:   Procedure note reviewed for L4-5 transforaminal epidural steroid injection    Assessment:  Encounter Diagnosis   Name Primary?    Lumbar radiculitis Yes   .      Plan:     The patient is a 79-year-old gentleman  with a longstanding history of low back pain and right-sided radicular symptoms.  The patient is treated largely also with lumbar transforaminal epidural steroid injection leading to significant pain relief.  This lasted for 2 years.  Now has return of pain which is preventing him from sleeping.  Has gone through 2 rounds of oral steroids without improvement.  Recommended repeat injection.  Also update advanced imaging.  Did provide prescription for gabapentin until injection can be completed.    Eugenio Ha MD MPH  Pain Management

## 2024-07-24 NOTE — PATIENT INSTRUCTIONS
Refill policies:    Allow 2-3 business days for refills; controlled substances may take longer.  Contact your pharmacy at least 5 days prior to running out of medication and have them send an electronic request or submit request through the “request refill” option in your [employer] account.  Refills are not addressed on weekends; covering physicians do not authorize routine medications on weekends.  No narcotics or controlled substances are refilled after noon on Fridays or by on call physicians.  By law, narcotics must be electronically prescribed.  A 30 day supply with no refills is the maximum allowed.  If your prescription is due for a refill, you may be due for a follow up appointment.  To best provide you care, patients receiving routine medications need to be seen at least once a year.  Patients receiving narcotic/controlled substance medications need to be seen at least once every 3 months.  In the event that your preferred pharmacy does not have the requested medication in stock (e.g. Backordered), it is your responsibility to find another pharmacy that has the requested medication available.  We will gladly send a new prescription to that pharmacy at your request.    Scheduling Tests:    If your physician has ordered radiology tests such as MRI or CT scans, please contact Central Scheduling at [phone] right away to schedule the test.  Once scheduled, the [facility] Centralized Referral Team will work with your insurance carrier to obtain pre-certification or prior authorization.  Depending on your insurance carrier, approval may take 3-10 days.  It is highly recommended patients assure they have received an authorization before having a test performed.  If test is done without insurance authorization, patient may be responsible for the entire amount billed.      Precertification and Prior Authorizations:  If your physician has recommended that you have a procedure or additional testing performed the [facility]  Centralized Referral Team will contact your insurance carrier to obtain pre-certification or prior authorization.    You are strongly encouraged to contact your insurance carrier to verify that your procedure/test has been approved and is a COVERED benefit.  Although the [facility] Centralized Referral Team does its due diligence, the insurance carrier gives the disclaimer that \"Although the procedure is authorized, this does not guarantee payment.\"    Ultimately the patient is responsible for payment.   Thank you for your understanding in this matter.  Paperwork Completion:  If you require FMLA or disability paperwork for your recovery, please make sure to either drop it off or have it faxed to our office at [phone]. Be sure the form has your name and date of birth on it.  The form will be faxed to our Forms Department and they will complete it for you.  There is a 25$ fee for all forms that need to be filled out.  Please be aware there is a 10-14 day turnaround time.  You will need to sign a release of information (MORIAH) form if your paperwork does not come with one.  You may call the Forms Department with any questions at [phone].  Their fax number is [phone].

## 2024-07-25 ENCOUNTER — TELEPHONE (OUTPATIENT)
Dept: PAIN CLINIC | Facility: CLINIC | Age: 79
End: 2024-07-25

## 2024-07-25 DIAGNOSIS — M54.16 LUMBAR RADICULITIS: Primary | ICD-10-CM

## 2024-07-25 NOTE — TELEPHONE ENCOUNTER
Prior authorization request completed for: left L4/5 TLESI  Authorization #no auth needed   Authorization dates: 7/25/24-10/23/24  CPT codes approved: 08645  Number of visits/dates of service approved: 1  Physician: mercedez  Location: [facility]     Notification or Prior Authorization is not required for the requested services.    You are not required to submit a notification/prior authorization based on the information provided. If you have general questions about the prior authorization requirements, visit [employer] > Clinician Resources > Advance and Admission Notification Requirements. The number above acknowledges your notification. Please write this reference number down for future reference. If you would like to request an organization determination, please call us at [phone].    Decision ID #: [id number]    Patient can be scheduled. Routed to Navigator.

## 2024-07-26 ENCOUNTER — TELEPHONE (OUTPATIENT)
Dept: PAIN CLINIC | Facility: CLINIC | Age: 79
End: 2024-07-26

## 2024-07-26 NOTE — TELEPHONE ENCOUNTER
Eugenio Ha MD   to Me       7/26/24  9:49 AM  No need to wean. He can just stop. No alternative.    Contacted pt at this time, relayed th message from . Pt vu and no further questions.

## 2024-07-26 NOTE — TELEPHONE ENCOUNTER
Patient asked if MRI was going to be necessary? Since injection was ordered. Informed patient below from [facility] w/ on 07/24/24 that MRI is needed :   Plan:   The patient is a 79-year-old gentleman with a longstanding history of low back pain and right-sided radicular symptoms.  The patient is treated largely also with lumbar transforaminal epidural steroid injection leading to significant pain relief.  This lasted for 2 years.  Now has return of pain which is preventing him from sleeping.  Has gone through 2 rounds of oral steroids without improvement.  Recommended repeat injection.  Also update advanced imaging.  Did provide prescription for gabapentin until injection can be completed.     Eugenio Ha MD MPH  Pain Management      Patient VU.

## 2024-07-26 NOTE — TELEPHONE ENCOUNTER
Patient started taking gabapentin 100 MG Oral Cap on 7/24.  Patient feels he is having a reaction to the medication. He has fallen twice and is having muscle weakness.  Patient would like to speak with RN.

## 2024-07-26 NOTE — TELEPHONE ENCOUNTER
RN received a call from the patient, who stated that he began taking Gabapentin as prescribed by Dr. Ha on July 24, 2024. Pt reports that he is experiencing side effects from the drug, such as swelling in his left leg and difficulty with coordination.Pt wants to know if he needs to taper or stop using the drug. Pt is also calling for an alternative. We informed the patient that we will contact Dr. Ha for feedback. Pt vu, and no additional inquiries at this moment.

## 2024-07-30 ENCOUNTER — HOSPITAL ENCOUNTER (OUTPATIENT)
Dept: MRI IMAGING | Facility: HOSPITAL | Age: 79
Discharge: HOME | End: 2024-07-30
Attending: ANESTHESIOLOGY
Payer: MEDICARE

## 2024-07-30 DIAGNOSIS — M54.16 LUMBAR RADICULITIS: ICD-10-CM

## 2024-07-30 PROCEDURE — 72148 MRI LUMBAR SPINE W/O DYE: CPT | Performed by: ANESTHESIOLOGY

## 2024-08-05 ENCOUNTER — HOSPITAL ENCOUNTER (OUTPATIENT)
Facility: HOSPITAL | Age: 79
Setting detail: HOSPITAL OUTPATIENT SURGERY
Discharge: HOME | End: 2024-08-05
Attending: ANESTHESIOLOGY | Admitting: ANESTHESIOLOGY
Payer: MEDICARE

## 2024-08-05 ENCOUNTER — APPOINTMENT (OUTPATIENT)
Dept: GENERAL RADIOLOGY | Facility: HOSPITAL | Age: 79
End: 2024-08-05
Attending: ANESTHESIOLOGY
Payer: MEDICARE

## 2024-08-05 VITALS
SYSTOLIC BLOOD PRESSURE: 156 MMHG | TEMPERATURE: 98 F | HEART RATE: 64 BPM | OXYGEN SATURATION: 95 % | RESPIRATION RATE: 16 BRPM | WEIGHT: 202 LBS | HEIGHT: 72 IN | BODY MASS INDEX: 27.36 KG/M2 | DIASTOLIC BLOOD PRESSURE: 74 MMHG

## 2024-08-05 PROCEDURE — 3E0R33Z INTRODUCTION OF ANTI-INFLAMMATORY INTO SPINAL CANAL, PERCUTANEOUS APPROACH: ICD-10-PCS | Performed by: ANESTHESIOLOGY

## 2024-08-05 PROCEDURE — 64483 NJX AA&/STRD TFRM EPI L/S 1: CPT | Performed by: ANESTHESIOLOGY

## 2024-08-05 RX ORDER — LIDOCAINE HYDROCHLORIDE 10 MG/ML
INJECTION, SOLUTION EPIDURAL; INFILTRATION; INTRACAUDAL; PERINEURAL
Status: DISCONTINUED | OUTPATIENT
Start: 2024-08-05 | End: 2024-08-05 | Stop reason: HOSPADM

## 2024-08-05 RX ORDER — DEXAMETHASONE SODIUM PHOSPHATE 10 MG/ML
INJECTION, SOLUTION INTRAMUSCULAR; INTRAVENOUS
Status: DISCONTINUED | OUTPATIENT
Start: 2024-08-05 | End: 2024-08-05 | Stop reason: HOSPADM

## 2024-08-05 RX ORDER — SODIUM CHLORIDE 9 MG/ML
INJECTION, SOLUTION INTRAMUSCULAR; INTRAVENOUS; SUBCUTANEOUS
Status: DISCONTINUED | OUTPATIENT
Start: 2024-08-05 | End: 2024-08-05 | Stop reason: HOSPADM

## 2024-08-05 NOTE — H&P
History & Physical Examination    Patient Name: Cruz Krishnan  MRN: [id number]  CSN: [id number]  YOB: 1945    Pre-Operative Diagnosis:  Lumbar radiculitis [M54.16]    Present Illness: Lumbar radiculitis    ASA: 2  MP class: 1  Sedation: Local      Medications Prior to Admission   Medication Sig Dispense Refill Last Dose    methylPREDNISolone (MEDROL) 4 MG Oral Tablet Therapy Pack Take as directed 1 each 0 Past Month    gabapentin 100 MG Oral Cap Take 1 capsule (100 mg total) by mouth 3 (three) times daily. 90 capsule 0 Past Month    VENLAFAXINE 75 MG Oral Tab TAKE 1 TABLET BY MOUTH TWICE A  tablet 0 8/4/2024 at 2000    PANTOPRAZOLE 40 MG Oral Tab EC TAKE 1 TABLET BY MOUTH EVERY DAY IN THE MORNING BEFORE BREAKFAST 90 tablet 0 Unknown    ipratropium 0.06 % Nasal Solution 2 sprays by Nasal route 3 (three) times daily as needed. 15 mL 0     albuterol 108 (90 Base) MCG/ACT Inhalation Aero Soln Inhale 2 puffs into the lungs every 6 (six) hours as needed. 18 g 0      No current facility-administered medications for this encounter.       Allergies:   Allergies   Allergen Reactions    Medrol [Methylprednisolone] SWELLING, DIZZINESS and RESTLESSNESS     Only oral tablets        Past Medical History:    Cancer (HCC)     Past Surgical History:   Procedure Laterality Date    Cataracts, ophth (internal)      Skin surgery  02/22/2021    En face stage 1 - right nasal ala -Dr. GRACE Felix      History reviewed. No pertinent family history.  Social History     Tobacco Use    Smoking status: Former     Passive exposure: Past    Smokeless tobacco: Never   Substance Use Topics    Alcohol use: Yes     Alcohol/week: 1.0 standard drink of alcohol     Types: 1 Glasses of wine per week     Comment: 1 every couple days       SYSTEM Check if Review is Normal Check if Physical Exam is Normal If not normal, please explain:   HEENT [x ] [x ]    NECK & BACK [x ] [x ]    HEART [x ] [x ]    LUNGS [x ] [x ]    ABDOMEN [x ] [x ]     UROGENITAL [x ] [x ]    EXTREMITIES [x ] [x ]    OTHER        [ x ] I have discussed the risks and benefits and alternatives with the patient/family.  They understand and agree to proceed with plan of care.  [ x ] I have reviewed the History and Physical done within the last 30 days.  Any changes noted above.    Eugenio Ha MD

## 2024-08-05 NOTE — OPERATIVE REPORT
[facility]  Operative Report  2024     Cruz Krishnan Patient Status:  Hospital Outpatient Surgery    1945 MRN [id number]   Location [facility] Attending Eugenio Ha MD   Hosp Day # 0 PCP Tevin Kiser MD     Indication: Cruz is a 79 year old male with lumbar radiculitis. MRI reviewed consistent with radiculopathy.    Preoperative Diagnosis:  Lumbar radiculitis [M54.16]    Postoperative Diagnosis: Same as above.    Procedure performed: RIGHT lumbar 4 TRANSFORAMINAL EPIDURAL STEROID INJECTION SINGLE LEVEL  with local    Anesthesia: Local      EBL: Less than 1 ml.    Procedure Description:   After reviewing the patient's history and performing a focused physical examination, the diagnosis was confirmed and contraindications such as infection and coagulopathy were ruled out.  Following review of potential side effects and complications, including but not necessarily limited to infection, allergic reaction, local tissue breakdown, nerve injury, and paresis, the patient indicated they understood and agreed to proceed. After obtaining the informed consent, the patient was brought to the procedure room and monitored.  With lumbar radiculitis lumbar radiculitis     In the prone position, following sterile prep and drape of the lumbar region, the L4 neural foramen was identified under fluoroscopy.  The skin and subcutaneous tissue was anesthetized via 25-gauge 1.5\" needle with approximately 2 cc of 1% lidocaine.  A 22-gauge 5\" Quincke spinal needle was introduced toward the inferior aspect of the junction between the transverse process and pedicle of the L4 level atraumatically under fluoroscopic guidance. The needle was advanced into the anterior epidural space at this level. The needle position was confirmed under AP and lateral fluoroscopic view.  Following negative aspiration for CSF and blood, approximately 1 cc of Omnipaque 240 was injected.  An excellent contrast  spread along the epidural space and the nerve root was obtained.  At this point, 2 cc of normal saline with 10 mg of dexamethasone was injected without complication.  The needle was withdrawn with stylet in situ. The patient tolerated procedure very well.  The patient was observed until discharge criteria met.  Discharge instructions were given and patient was released to a responsible adult.       Complications: None.    Follow up: The patient was followed in the pain clinic as needed basis.      Eugenio Ha MD

## 2024-08-05 NOTE — DISCHARGE INSTRUCTIONS
Home Care Instructions Following Your Pain Procedure     Cruz,  It has been a pleasure to have you as our patient. To help you at home, you must follow these general discharge instructions. We will review these with you before you are discharged. It is our hope that you have a complete and uneventful recovery from our procedure.     General Instructions:  What to Expect:  Bandages from your procedure today can be removed when you get home.  Please avoid soaking and/or swimming for 24 hours.  Showering is okay  It is normal to have increased pain symptoms and/or pain at injection site for up to 3-5 days after procedure, you can use heat or ice (20 minutes on 20 minutes off) for comfort.  You may experience some temporary side effects which may include restlessness or insomnia, flushing of the face, or heart palpitations.  Please contact the provider if these symptoms do not resolve within 3-4 days.  Lightheadedness or nausea may occur and should resolve within 24 to 48 hours.  If you develop a headache after treatment, rest, drink fluids (with caffeine, if possible) and take mild over-the-counter pain medication.  If the headache does not improve with the above treatment, contact the physician.  Home Medications:  Resume all previously prescribed medication.  Please avoid taking NSAIDs (Non-Steriodal Anti-Inflammatory Drugs) such as:  Ibuprofen ( Advil, Motrin) Aleve (Naproxen), Diclofenac, Meloxicam for 6 hours after procedure.   If you are on Coumadin (Warfarin) or any other anti-coagulant (or \"blood thinning\") medication such as Plavix (Clopidogrel), Xarelto (Rivaroxaban), Eliquis (Apixaban), Effient (Prasugrel) etc., restart on the following day from the procedure unless otherwise directed by your provider.  If you are a diabetic, please increase the frequency of your glucose monitoring after the procedure as steroids may cause a temporary (2-3 day) increase in your blood sugar.  Contact your primary care  physician if your blood sugar remains elevated as you may require some medication adjustment.  Diet:  Resume your regular diet as tolerated.  Activity:  We recommend that you relax and rest during the rest of your procedure day.  If you feel weakness in your arms or legs do not drive.  Follow-up Appointment  Please schedule a follow-up visit within 3 to 4 weeks after your last procedure date.  Question or Concerns:  Feel free to call our office with any questions or concerns at [phone] (option #2)    Cruz  Thank you for coming to [facility] for your procedure.  The nurses try very hard to make sure you receive the best care possible.  Your trust in them as well as us is greatly appreciated.    Thanks so much,   Dr. Eugenio Ha

## 2024-08-06 ENCOUNTER — TELEPHONE (OUTPATIENT)
Dept: PAIN CLINIC | Facility: CLINIC | Age: 79
End: 2024-08-06

## 2024-08-06 NOTE — TELEPHONE ENCOUNTER
Follow-up call post pain procedure. Left message informing patient to contact the pain clinic at [phone] option #3 regarding any questions or concerns about recent pain procedure.       Procedure: RIGHT lumbar 4 TRANSFORAMINAL EPIDURAL STEROID INJECTION   Date: 8/5/2024  Follow up Visit Scheduled:

## 2024-09-04 ENCOUNTER — VIRTUAL PHONE E/M (OUTPATIENT)
Dept: PAIN CLINIC | Facility: CLINIC | Age: 79
End: 2024-09-04
Payer: COMMERCIAL

## 2024-09-04 DIAGNOSIS — R10.13 EPIGASTRIC PAIN: ICD-10-CM

## 2024-09-04 DIAGNOSIS — M54.16 LUMBAR RADICULITIS: Primary | ICD-10-CM

## 2024-09-04 PROCEDURE — G2252 BRIEF CHKIN BY MD/QHP, 11-20: HCPCS | Performed by: ANESTHESIOLOGY

## 2024-09-04 RX ORDER — PANTOPRAZOLE SODIUM 40 MG/1
40 TABLET, DELAYED RELEASE ORAL
Qty: 90 TABLET | Refills: 0 | Status: SHIPPED | OUTPATIENT
Start: 2024-09-04

## 2024-09-04 NOTE — PROGRESS NOTES
Virtual Telephone Check-In    Cruz Krishnan verbally consents to a Virtual/Telephone Check-In visit on 09/04/24.  Patient has been referred to the [facility] website at [email or web address] to review the yearly Consent to Treat document.    Patient understands and accepts financial responsibility for any deductible, co-insurance and/or co-pays associated with this service.    Duration of the service: 30 minutes      Summary of topics discussed:     The patient with multilevel lumbar degenerative disc disease leading to significant foraminal narrowing who is following up after lumbar transforaminal epidural steroid injection.  The patient reports significant improvement after the procedure.  However, his pain is not completely gone.  Reviewed his imaging.  Unlikely the pain to be completely resolved for surgical decompression.  Patient feels that his pain is annoyance.  Certainly not nearly as intense as prior to the injection.  Will send prescription for diclofenac.  Reviewed CMP numbers.      Eugenio Ha MD

## 2024-09-04 NOTE — TELEPHONE ENCOUNTER
Last office visit: 7/15/24   Protocol: pass  Requested medication(s) are due for refill today: yes  Requested medication(s) are on the active medication list same strength, form, dose/ sig: yes  Requested medication(s) are managed by provider: yes  Patient has already received a courtsey refill: no    NOV: 9/10/24  Last Labs: 2/20/24  Asked to Return: prn, later this year for annual

## 2024-09-14 DIAGNOSIS — F32.A ANXIETY AND DEPRESSION: ICD-10-CM

## 2024-09-14 DIAGNOSIS — F41.9 ANXIETY AND DEPRESSION: ICD-10-CM

## 2024-09-16 RX ORDER — VENLAFAXINE 75 MG/1
75 TABLET ORAL 2 TIMES DAILY
Qty: 180 TABLET | Refills: 0 | OUTPATIENT
Start: 2024-09-16

## 2024-10-02 NOTE — TELEPHONE ENCOUNTER
Medication:   diclofenac 50 MG Oral Tab EC     Date of last refill: 9/4/24    Last office visit: 9/4/24 virtual  Due back to clinic per last office note:  NA  Date next office visit scheduled:  NA    Last OV note recommendation: Summary of topics discussed:      The patient with multilevel lumbar degenerative disc disease leading to significant foraminal narrowing who is following up after lumbar transforaminal epidural steroid injection.  The patient reports significant improvement after the procedure.  However, his pain is not completely gone.  Reviewed his imaging.  Unlikely the pain to be completely resolved for surgical decompression.  Patient feels that his pain is annoyance.  Certainly not nearly as intense as prior to the injection.  Will send prescription for diclofenac.  Reviewed CMP numbers.        Eugenio Ha MD

## 2024-12-12 DIAGNOSIS — F32.A ANXIETY AND DEPRESSION: ICD-10-CM

## 2024-12-12 DIAGNOSIS — F41.9 ANXIETY AND DEPRESSION: ICD-10-CM

## 2024-12-12 RX ORDER — VENLAFAXINE 75 MG/1
75 TABLET ORAL 2 TIMES DAILY
Qty: 180 TABLET | Refills: 0 | Status: SHIPPED | OUTPATIENT
Start: 2024-12-12

## 2024-12-12 NOTE — TELEPHONE ENCOUNTER
Agreed, I have sent a new prescription for Venlafaxine 75 mg BID (150 mg total per day.     Tevin Kiser MD, 12/12/24, 10:54 AM

## 2024-12-12 NOTE — TELEPHONE ENCOUNTER
Patient returned call   LOV 9/10/24  Pt reports he has noticed some dizziness and trouble focusing \"not extreme but a bit uncomfortable\"  Symptoms started when increasing dose from 75 mg BID to 225 mg daily  Would like to go back to 75 mg BID if ok

## 2024-12-12 NOTE — ADDENDUM NOTE
[facility]  [address] [address]  [phone]  Dept: [phone]    PRE-OP EVALUATION:  Today's date: 3/21/2018    Mckayla Grady (: 1953) presents for pre-operative evaluation assessment as requested by Dr. Sibley .  She requires evaluation and anesthesia risk assessment prior to undergoing surgery/procedure for treatment of meniscal injury  .    Proposed Surgery/ Procedure: Arthroscopy of  L knee  Date of Surgery/ Procedure: 3/28/18  Time of Surgery/ Procedure: 9:30 am   Hospital/Surgical Facility: [facility]  Fax [phone]   Primary Physician: No Ref-Primary, Physician  Type of Anesthesia Anticipated: General    Patient has a Health Care Directive or Living Will:  NO    1. NO - Do you have a history of heart attack, stroke, stent, bypass or surgery on an artery in the head, neck, heart or legs?  2. YES - Do you ever have any pain or discomfort in your chest? From gerd   3. NO - Do you have a history of  Heart Failure?  4. NO - Are you troubled by shortness of breath when: walking on the level, up a slight hill or at night?  5. NO - Do you currently have a cold, bronchitis or other respiratory infection?  6. NO - Do you have a cough, shortness of breath or wheezing?  7. NO - Do you sometimes get pains in the calves of your legs when you walk?  8. NO - Do you or anyone in your family have previous history of blood clots?  9. NO - Do you or does anyone in your family have a serious bleeding problem such as prolonged bleeding following surgeries or cuts?  10. YES - Have you ever had problems with anemia or been told to take iron pills? **Uterine fibroid , since resolved  11. NO - Have you had any abnormal blood loss such as black, tarry or bloody stools, or abnormal vaginal bleeding?  12. NO - Have you ever had a blood transfusion?  13. NO - Have you or any of your relatives ever had problems with anesthesia?  14. NO - Do you have  Addended by: ANNABEL BLUM on: 12/12/2024 10:52 AM     Modules accepted: Orders     sleep apnea, excessive snoring or daytime drowsiness?  15. NO - Do you have any prosthetic heart valves?  16. NO - Do you have prosthetic joints?  17. NO - Is there any chance that you may be pregnant?      HPI:     HPI related to upcoming procedure: knee injury a year ago, recent worsening after x-country skiing.  MRI shows meniscal injury.           MEDICAL HISTORY:     Patient Active Problem List    Diagnosis Date Noted     Elevated blood pressure reading without diagnosis of hypertension 05/19/2017     Priority: Medium     Chronic bilateral low back pain with left-sided sciatica 11/09/2016     Priority: Medium     S/P lumbar fusion 08/04/2016     Priority: Medium     Elevated TSH 07/12/2016     Priority: Medium     Left breast mass 05/20/2015     Priority: Medium     normal fibroglandular tissue - US and diagnostic mammo       Dense breasts 05/14/2015     Priority: Medium     Anxiety state 04/03/2015     Priority: Medium     Problem list name updated by automated process. Provider to review       S/P complete hysterectomy 01/06/2015     Priority: Medium     paps no longer indicated       Overweight 10/21/2014     Priority: Medium     Problem list name updated by automated process. Provider to review       DDD (degenerative disc disease), lumbar 01/08/2014     Priority: Medium     Lumbar disc herniation 01/08/2014     Priority: Medium     Lumbar foraminal stenosis 01/08/2014     Priority: Medium     Central spinal stenosis 01/08/2014     Priority: Medium     Hip pain 11/03/2013     Priority: Medium     CARDIOVASCULAR SCREENING; LDL GOAL LESS THAN 160 10/31/2010     Priority: Medium     Family history of colon cancer 05/07/2009     Priority: Medium     Colon polyps 05/07/2009     Priority: Medium     Adenomatous polyps       Esophageal reflux 06/16/2006     Priority: Medium     Calculus of kidney 10/05/2005     Priority: Medium      Past Medical History:   Diagnosis Date     Esophageal reflux      Hallux rigidus      bilatateral     Insomnia, unspecified      Numbness and tingling     localized to LLE; > TOP OF FOOT & CALF AREAS     Past Surgical History:   Procedure Laterality Date     APPENDECTOMY       C EXPLORE/TREAT ANKLE JOINT  1989     C TOTAL ABDOM HYSTERECTOMY  2002    with bilat ovary removal     COLONOSCOPY  4/2009     COLONOSCOPY  4/2012    Repeat in 5 years     DISCECTOMY LUMBAR POSTERIOR MICROSCOPIC ONE LEVEL Right 1/8/2015    Procedure: DISCECTOMY LUMBAR POSTERIOR MICROSCOPIC ONE LEVEL;  Surgeon: Román Smith MD;  Location: [facility] OR     DISKECTOMY, LUMBAR, SINGLE SP  2001    X 2     DISKECTOMY, LUMBAR, SINGLE SP  2006      HC REVISE MEDIAN N/CARPAL TUNNEL SURG  2002     HYSTERECTOMY, PAP NO LONGER INDICATED       OPTICAL TRACKING SYSTEM FUSION SPINE POSTERIOR LUMBAR TWO LEVELS N/A 8/4/2016    Procedure: OPTICAL TRACKING SYSTEM FUSION SPINE POSTERIOR LUMBAR TWO LEVELS;  Surgeon: Román Smith MD;  Location:  OR     ORTHOPEDIC SURGERY  9/2015    GT TOE JOINT FUSION - BILATERAL     SURGICAL HISTORY OF -       C-secs x3     SURGICAL HISTORY OF -   1976    laparoscopy benign enlarged lymph node     SURGICAL HISTORY OF -   1963    appendectomy     Current Outpatient Prescriptions   Medication Sig Dispense Refill     gabapentin (NEURONTIN) 300 MG capsule Take 1 capsule (300 mg) by mouth 3 times daily (Patient taking differently: Take 600 mg by mouth At Bedtime ) 90 capsule 3     traZODone (DESYREL) 50 MG tablet TAKE 1-2 TABLETS BY MOUTH BEFORE BED FOR SLEEP AS NEEDED 90 tablet 0     omeprazole (PRILOSEC OTC) 20 MG tablet Take 20 mg by mouth as needed        [DISCONTINUED] TRAZODONE HCL PO Reported on 5/19/2017       OTC products: melatonin sleep capsule, motrin.     Allergies   Allergen Reactions     Ampicillin      GI upset      Latex Allergy: NO    Social History   Substance Use Topics     Smoking status: Never Smoker     Smokeless tobacco: Never Used     Alcohol use 0.0 oz/week     0  Standard drinks or equivalent per week      Comment: 6/week     History   Drug Use No       REVIEW OF SYSTEMS:   Const, HENT, cv, pulm, gi, msk, heme reviewed,  otherwise negative unless noted above.       EXAM:   /80 (BP Location: Left arm, Patient Position: Chair, Cuff Size: Adult Regular)  Pulse 53  Temp 98.8  F (37.1  C) (Tympanic)  Wt 175 lb (79.4 kg)  LMP 05/21/2002  SpO2 97%  BMI 27.41 kg/m2    Gen: well appearing, pleasant woman, no distress  HEENT: PERRL, sclera nonicteric, oropharynx clear, MMM  Neck: supple, no LAD  Pulm: breathing comfortably, CTAB, no wheezes or rales  CV: RRR, normal S1 and S2, no murmurs  Abd: BS present, soft, nontender, nondistended  Ext: 2+ distal pulses, no LE edema       DIAGNOSTICS:   EKG: Not indicated due to non-vascular surgery and low risk of event (age <65 and without cardiac risk factors)    Recent Labs   Lab Test  07/28/16   1123  07/12/16   0750  09/08/15   1759   06/22/11   0805   HGB  14.1   --   13.1   < >   --    NA   --   141   --    --   142   POTASSIUM   --   4.0   --    --   4.1   CR   --   0.70   --    --   0.68    < > = values in this interval not displayed.        IMPRESSION:   Reason for surgery/procedure: left knee arthroscopy   Diagnosis/reason for consult: pre-op eval    The proposed surgical procedure is considered INTERMEDIATE risk.    REVISED CARDIAC RISK INDEX  The patient has the following serious cardiovascular risks for perioperative complications such as (MI, PE, VFib and 3  AV Block):  No serious cardiac risks  INTERPRETATION: 0 risks: Class I (very low risk - 0.4% complication rate)    The patient has the following additional risks for perioperative complications:  No identified additional risks      ICD-10-CM    1. Preop general physical exam Z01.818        RECOMMENDATIONS:         APPROVAL GIVEN to proceed with proposed procedure, without further diagnostic evaluation       Signed Electronically by: Jennifer Vargas MD    Copy of  this evaluation report is provided to requesting physician.    [facility] Preop Guidelines

## 2024-12-12 NOTE — TELEPHONE ENCOUNTER
Patient is having a little dizziness and little trouble focusing on the venlafaxine  MG Oral Tablet 24 Hr.  He would like to go back to the 75 mg dosage - to [facility].

## 2025-02-10 ENCOUNTER — PATIENT MESSAGE (OUTPATIENT)
Dept: FAMILY MEDICINE CLINIC | Facility: CLINIC | Age: 80
End: 2025-02-10

## 2025-02-10 DIAGNOSIS — K21.9 GASTROESOPHAGEAL REFLUX DISEASE, UNSPECIFIED WHETHER ESOPHAGITIS PRESENT: Primary | ICD-10-CM

## 2025-02-10 NOTE — TELEPHONE ENCOUNTER
I referred patient to Dr. Avalos, please notify patient through MyChart.     Tevin Kiser MD, 02/10/25, 1:04 PM

## 2025-03-03 DIAGNOSIS — R10.13 EPIGASTRIC PAIN: ICD-10-CM

## 2025-03-03 NOTE — TELEPHONE ENCOUNTER
Last office visit: 9/10/24   Protocol: pass  Requested medication(s) are due for refill today: yes  Requested medication(s) are on the active medication list same strength, form, dose/ sig: yes  Requested medication(s) are managed by provider: yes  Patient has already received a courtsey refill: no    NOV: none    Asked to Return: annual in 3-6 months

## 2025-03-06 RX ORDER — PANTOPRAZOLE SODIUM 40 MG/1
40 TABLET, DELAYED RELEASE ORAL
Qty: 90 TABLET | Refills: 0 | Status: SHIPPED | OUTPATIENT
Start: 2025-03-06

## 2025-03-16 PROBLEM — K21.9 GASTROESOPHAGEAL REFLUX DISEASE: Status: ACTIVE | Noted: 2025-03-16

## 2025-03-20 PROBLEM — Q40.1 CONGENITAL HIATUS HERNIA: Status: ACTIVE | Noted: 2025-03-20

## 2025-03-25 ENCOUNTER — OFFICE VISIT (OUTPATIENT)
Dept: FAMILY MEDICINE CLINIC | Facility: CLINIC | Age: 80
End: 2025-03-25
Payer: COMMERCIAL

## 2025-03-25 VITALS
HEART RATE: 72 BPM | BODY MASS INDEX: 27.77 KG/M2 | DIASTOLIC BLOOD PRESSURE: 70 MMHG | RESPIRATION RATE: 16 BRPM | SYSTOLIC BLOOD PRESSURE: 136 MMHG | HEIGHT: 72 IN | OXYGEN SATURATION: 96 % | WEIGHT: 205 LBS

## 2025-03-25 DIAGNOSIS — F41.9 ANXIETY AND DEPRESSION: ICD-10-CM

## 2025-03-25 DIAGNOSIS — M54.16 LUMBAR RADICULITIS: ICD-10-CM

## 2025-03-25 DIAGNOSIS — Q40.1 CONGENITAL HIATUS HERNIA: Primary | ICD-10-CM

## 2025-03-25 DIAGNOSIS — F32.A ANXIETY AND DEPRESSION: ICD-10-CM

## 2025-03-25 PROCEDURE — 1170F FXNL STATUS ASSESSED: CPT | Performed by: STUDENT IN AN ORGANIZED HEALTH CARE EDUCATION/TRAINING PROGRAM

## 2025-03-25 PROCEDURE — 1160F RVW MEDS BY RX/DR IN RCRD: CPT | Performed by: STUDENT IN AN ORGANIZED HEALTH CARE EDUCATION/TRAINING PROGRAM

## 2025-03-25 PROCEDURE — 99212 OFFICE O/P EST SF 10 MIN: CPT | Performed by: STUDENT IN AN ORGANIZED HEALTH CARE EDUCATION/TRAINING PROGRAM

## 2025-03-25 PROCEDURE — 1159F MED LIST DOCD IN RCRD: CPT | Performed by: STUDENT IN AN ORGANIZED HEALTH CARE EDUCATION/TRAINING PROGRAM

## 2025-03-25 PROCEDURE — 3075F SYST BP GE 130 - 139MM HG: CPT | Performed by: STUDENT IN AN ORGANIZED HEALTH CARE EDUCATION/TRAINING PROGRAM

## 2025-03-25 PROCEDURE — 3008F BODY MASS INDEX DOCD: CPT | Performed by: STUDENT IN AN ORGANIZED HEALTH CARE EDUCATION/TRAINING PROGRAM

## 2025-03-25 PROCEDURE — 3078F DIAST BP <80 MM HG: CPT | Performed by: STUDENT IN AN ORGANIZED HEALTH CARE EDUCATION/TRAINING PROGRAM

## 2025-03-25 NOTE — PROGRESS NOTES
[facility] Family Medicine Office Note    HPI:     Cruz Krishnan is a 79 year old male presenting for f/u.     GERD, hiatal hernia  On pantoprazole 40 mg   Has seen GI and underwent EGD, has hiatal hernia. GI recommended f/u with surgeon for which patient has f/u appointment scheduled on .     Lumbar radiculitis, multilevel lumbar DDD  Sees pain medicine, s/p lumbar transforaminal epidural steroid injection, endorses improvement in symptoms after procedure (about \"90% better\" per patient).   Was also prescribed diclofenanc, medrol and gabapentin in the past      Anxiety/depression   Stable on venlafaxine 75 mg BID.       HISTORY:  Past Medical History:    Abdominal hernia    Abdominal pain    Anxiety    Arthritis    Back pain    Belching    Calculus of kidney    Cancer (HCC)    Chronic cough    Diarrhea, unspecified    Fatigue    Heartburn    Indigestion    Pain in joints    Shortness of breath    Sleep disturbance    Stress    Uncomfortable fullness after meals    Vomiting    Wears glasses    Weight gain      Past Surgical History:   Procedure Laterality Date    Cataracts, ophth (internal)      Colonoscopy      Knee replacement surgery      Skin surgery  2021    En face stage 1 - right nasal ala -Dr. GRACE Felix       Family History   Problem Relation Age of Onset    Alcohol and Other Disorders Associated Father         Alcoholism      Social History:   Social History     Socioeconomic History    Marital status:    Tobacco Use    Smoking status: Former     Current packs/day: 0.00     Average packs/day: 1 pack/day for 22.0 years (22.0 ttl pk-yrs)     Types: Cigarettes     Quit date: 1985     Years since quittin.2     Passive exposure: Past    Smokeless tobacco: Never   Vaping Use    Vaping status: Never Used   Substance and Sexual Activity    Alcohol use: Yes     Alcohol/week: 5.0 standard drinks of alcohol     Types: 3 Glasses of wine, 2 Cans of beer per week    Drug use: Never      Social Drivers of Health     Food Insecurity: No Food Insecurity (3/25/2025)    NCSS - Food Insecurity     Worried About Running Out of Food in the Last Year: No     Ran Out of Food in the Last Year: No   Transportation Needs: No Transportation Needs (3/25/2025)    NCSS - Transportation     Lack of Transportation: No   Housing Stability: Low Risk  (6/1/2022)    [facility]    Housing Stability     Are you concerned about having a safe and reliable place to live?: No        Medications (Active prior to today's visit):  Current Outpatient Medications   Medication Sig Dispense Refill    venlafaxine 75 MG Oral Tab TAKE 1 TABLET BY MOUTH TWICE A  tablet 1    PANTOPRAZOLE 40 MG Oral Tab EC TAKE 1 TABLET BY MOUTH EVERY DAY IN THE MORNING BEFORE BREAKFAST 90 tablet 0       Allergies:  Allergies[1]      ROS:   Review of Systems   Constitutional:  Negative for chills and fever.     Otherwise see HPI    PHYSICAL EXAM:   /70 (BP Location: Left arm, Patient Position: Sitting, Cuff Size: adult)   Pulse 72   Resp 16   Ht 6' (1.829 m)   Wt 205 lb (93 kg)   SpO2 96%   BMI 27.80 kg/m²  Estimated body mass index is 27.8 kg/m² as calculated from the following:    Height as of this encounter: 6' (1.829 m).    Weight as of this encounter: 205 lb (93 kg).   Vital signs reviewed.Appears stated age, well groomed.    Physical Exam  Constitutional:       General: He is not in acute distress.  Neurological:      Mental Status: He is alert.           ASSESSMENT/PLAN:     79 year old male presenting for f/u.     1. Congenital hiatus hernia  - f/u with surgeon on 4/16 for evaluation for possible future hiatal hernia surgery   - continue pantoprazole for now    2. Lumbar radiculitis  - stable, continue present management    3. Anxiety and depression  - stable, continue present management with venlafaxine 75 mg BID.     Follow-up: in 6 months for annual  exam    Outcome: Patient verbalizes understanding. Patient is notified to call with any questions, complications, allergies, or worsening or changing symptoms.  Patient is to call with any side effects or complications from the treatments as a result of today.     Total length of visit/charting: approximately 16 min    Tevin Kiser MD, 03/25/25, 9:32 AM      Please note that portions of this note may have been completed with a voice recognition program. Efforts were made to edit the dictations but occasionally words are mis-transcribed.       [1]   Allergies  Allergen Reactions    Medrol [Methylprednisolone] SWELLING, DIZZINESS and RESTLESSNESS     Only oral tablets

## 2025-04-15 NOTE — CONSULTS
Thoracic Surgery Consult Note     Name: Cruz Krishnan   Age: 79 year old   Sex: male.   MRN: [id number]    Reason for Consultation: hiatal hernia    Consulting Physician: Dr. Christian    Subjective:     Chief Complaint: \"I have a hernia\"     History of Present Illness:   Mr. Krishnan is a 79 year old male former smoker (quit 20 years ago) presenting with a hiatal hernia.     Patient has known about this hiatal hernia for the past 20 years. However, he was asymptomatic until the past year. EGD for worsening symptoms from 3/20/25 showed a large 10cm paraesophageal hernia. Biopsies negative for dysplasia or malignancy. Patient was referred by Dr. Webb to discuss surgery.     Patient reports worsening heartburn over the past year. Prior, he was asymptomatic. He feels the pain in the center of his chest, it is worse after eating, and with certain foods such as chocolate. Occasional vomiting of clear phlegm. He denies dysphagia. He is taking pantoprazole once daily. He has a productive cough in the morning. He denies shortness of breath, chest pain, fevers, chills, weight loss.     PMH includes bladder cancer 6 years ago. No history of heart disease. No blood thinners. No prior thoracic surgeries or abdominal surgeries. Patient denies family history of cancer.     Review Of Systems:   10 point review of systems was conducted and was negative except for the pertinent positives listed in the above HPI.    Past Medical History: Past Medical History[1]    Past Surgical History: Past Surgical History[2]    Social History:   Social History     Socioeconomic History    Marital status:      Spouse name: Not on file    Number of children: Not on file    Years of education: Not on file    Highest education level: Not on file   Occupational History    Not on file   Tobacco Use    Smoking status: Former     Current packs/day: 0.00     Average packs/day: 1 pack/day for 22.0 years (22.0 ttl pk-yrs)     Types:  Cigarettes     Quit date: 1985     Years since quittin.3     Passive exposure: Past    Smokeless tobacco: Never   Vaping Use    Vaping status: Never Used   Substance and Sexual Activity    Alcohol use: Yes     Alcohol/week: 5.0 standard drinks of alcohol     Types: 3 Glasses of wine, 2 Cans of beer per week    Drug use: Never    Sexual activity: Not on file   Other Topics Concern    Not on file   Social History Narrative    Not on file     Social Drivers of Health     Food Insecurity: No Food Insecurity (3/25/2025)    NCSS - Food Insecurity     Worried About Running Out of Food in the Last Year: No     Ran Out of Food in the Last Year: No   Transportation Needs: No Transportation Needs (3/25/2025)    NCSS - Transportation     Lack of Transportation: No   Stress: Not on file   Housing Stability: Low Risk  (2022)    Received from [facility]    Housing Stability     Are you concerned about having a safe and reliable place to live?: No       Family History: Family History[3]    Allergies:  Allergies[4]    Medications:   Medications - Current[5]  Current Medications[6]      Objective:      Vital Signs:  BP (!) 168/93 (BP Location: Right arm, Patient Position: Sitting, Cuff Size: adult)   Pulse 70   Temp 97.8 °F (36.6 °C) (Oral)   Resp 16   Ht 1.829 m (6' 0.01\")   Wt 89.1 kg (196 lb 6.4 oz)   SpO2 98%   BMI 26.63 kg/m²     Physical Exam:  General: well appearing male in no acute distress  HEENT: Normocephalic, PERRL, EOMI, no scleral icterus  Neck: Supple, trachea midline, no JVD, no masses. Thyroid not grossly enlarged  Nodes: no cervical or supraclavicular lymphadenopathy appreciated  Heart: regular rate and rhythm. No murmurs, rubs or gallops. No lower extremity edema.  Lungs: Normal respiratory effort. Clear to ascultation bilaterally.   Abdomen: Soft, Non-tender, non-distended. No hepatosplenomegaly noted.  Extremities: No clubbing or cyanosis. No lateralizing  weakness  Neuro: No gross cranial nerve defects, no loss of sensation  Psych:  oriented to person place and time, normal mood and affect      Labs:   Lab Results   Component Value Date/Time    WBC 6.9 02/20/2024 11:50 AM    HGB 14.7 02/20/2024 11:50 AM    HCT 44.1 02/20/2024 11:50 AM    .0 02/20/2024 11:50 AM    .8 (H) 02/20/2024 11:50 AM     Lab Results   Component Value Date/Time     02/20/2024 11:50 AM    K 4.4 02/20/2024 11:50 AM     02/20/2024 11:50 AM    CO2 20.0 (L) 02/20/2024 11:50 AM    BUN 14 02/20/2024 11:50 AM    GLU 63 (L) 02/20/2024 11:50 AM    CA 9.4 02/20/2024 11:50 AM     No results found for: \"INR\", \"PT\", \"PTT\"  No components found for: \"TROPI\"  Lab Results   Component Value Date/Time    ALB 3.6 02/20/2024 11:50 AM    TP 7.8 02/20/2024 11:50 AM    ALT 29 02/20/2024 11:50 AM    AST 35 02/20/2024 11:50 AM         Review of Data:   EGD 3/20/25  Findings:  1. Esophagus: Normal.  2. EGJ: Large 10 cm paraesophageal hernia  3. Stomach: Normal  4. Duodenum: Normal    CT Abdomen 3/5/2024  FINDINGS:    LUNG BASE:  There is a large hiatal hernia.  Scattered atelectasis and/or scarring.  LIVER:  Unremarkable.  BILIARY:  Unremarkable.  SPLEEN:  Unremarkable.  PANCREAS:  Unremarkable.  ADRENALS:  Unremarkable.  KIDNEYS:  Unremarkable.  AORTA/VASCULAR:  Scattered atherosclerosis.  BONES:  Degenerative changes in the spine.      Impression   CONCLUSION:  Large hiatal hernia.       Assessment/Plan:     Mr. Krishnan is a 79 year old male  former smoker (quit 20 years ago) presenting with a hiatal hernia.     Patient has known about his hiatal hernia for years. However, he was asymptomatic until the past year. EGD for worsening symptoms from 3/20/25 showed a large 10cm paraesophageal hernia. Biopsies negative for dysplasia or malignancy. Patient was referred by Dr. Webb to discuss surgery.     Discussed options including robotic assisted hiatal hernia repair and fundoplication. Discussed  the risks and benefits of surgery. Given the size of his hernia and progressive symptoms, we feel he is a candidate for surgery. During surgery, he may require possible mesh placement or possible charla gastroplasty. Patient expressed understanding and would like to proceed with surgery.     -Scheduled for robotic assisted hiatal hernia repair and fundoplication, possible mesh placement, possible charla gastroplasty on 6/2/25 at [place] with Dr. Hogan  -Pre op labs ordered    Jessica Santana PA-C  Thoracic Surgery    I have seen and examined that patient and agree with the above assessment and plan.  I have personally reviewed all the pertinent imaging, discussed the case with gastroenterology and with the consulting physician.    Mr. Krishnan is a 79 year old male seen today in consult for a large, paraesophageal hernia. This was found years ago but was never symptomatic until recently. His symptoms consist of pain, heartburn and occasional regurgitation despite treatment with a PPI.  I have review Mr. Krishnan's CT scan, and endoscopy results.  Based on these results, I feel that he is a good candidate for a robotic reduction of the herniated stomach, repair of the hiatus and fundoplication. I went over the details of the operation as well as the risks (enteric, liver or nerve injury, dysphagia, failure to improve symptoms and recurrence) as well as the alternatives (continued medical treatment and behavioral modifications).  I noted to Mr. Krishnan that, due to the size and chronicity of the hernia, a Charla gastroplasty my be needed. The size and his age also make mesh placement a distinct possibility as well. Mr. Krishnan understands these risks and wishes to proceed.  We will plan on surgery June 2nd at [facility].    Triston Hogan MD  Thoracic Surgery  Pager: [phone]    I spent 60minutes on this visit which included: review of tests, independently interpreting results, obtaining history,  counseling on additional tests and procedures, communicating with the consulting physician,  care coordination and surgery, its risks and alternatives as described in the above assessment and plan.            [1]   Past Medical History:   Abdominal hernia    Abdominal pain    Anxiety    Arthritis    Back pain    Belching    Calculus of kidney    Cancer (HCC)    Chronic cough    Diarrhea, unspecified    Fatigue    Heartburn    Indigestion    Pain in joints    Shortness of breath    Sleep disturbance    Stress    Uncomfortable fullness after meals    Vomiting    Wears glasses    Weight gain   [2]   Past Surgical History:  Procedure Laterality Date    Cataracts, ophth (internal)      Colonoscopy      Knee replacement surgery      Skin surgery  02/22/2021    En face stage 1 - right nasal ala -Dr. GRACE Felix    [3]   Family History  Problem Relation Age of Onset    Alcohol and Other Disorders Associated Father         Alcoholism   [4]   Allergies  Allergen Reactions    Medrol [Methylprednisolone] SWELLING, DIZZINESS and RESTLESSNESS     Only oral tablets    [5]   Current Outpatient Medications:     venlafaxine 75 MG Oral Tab, TAKE 1 TABLET BY MOUTH TWICE A DAY, Disp: 180 tablet, Rfl: 1    PANTOPRAZOLE 40 MG Oral Tab EC, TAKE 1 TABLET BY MOUTH EVERY DAY IN THE MORNING BEFORE BREAKFAST, Disp: 90 tablet, Rfl: 0  [6]   No current facility-administered medications on file as of 4/16/2025.

## 2025-04-16 ENCOUNTER — OFFICE VISIT (OUTPATIENT)
Dept: SURGERY | Facility: CLINIC | Age: 80
End: 2025-04-16
Payer: COMMERCIAL

## 2025-04-16 VITALS
HEART RATE: 70 BPM | DIASTOLIC BLOOD PRESSURE: 93 MMHG | BODY MASS INDEX: 26.6 KG/M2 | WEIGHT: 196.38 LBS | TEMPERATURE: 98 F | HEIGHT: 72.01 IN | SYSTOLIC BLOOD PRESSURE: 168 MMHG | RESPIRATION RATE: 16 BRPM | OXYGEN SATURATION: 98 %

## 2025-04-16 DIAGNOSIS — K44.9 HIATAL HERNIA: Primary | ICD-10-CM

## 2025-05-20 ENCOUNTER — APPOINTMENT (OUTPATIENT)
Dept: ADMINISTRATIVE | Facility: HOSPITAL | Age: 80
End: 2025-05-20
Payer: MEDICARE

## 2025-05-27 ENCOUNTER — TELEPHONE (OUTPATIENT)
Age: 80
End: 2025-05-27

## 2025-05-27 ENCOUNTER — EKG ENCOUNTER (OUTPATIENT)
Dept: LAB | Age: 80
End: 2025-05-27
Attending: STUDENT IN AN ORGANIZED HEALTH CARE EDUCATION/TRAINING PROGRAM
Payer: MEDICARE

## 2025-05-27 DIAGNOSIS — K44.9 HIATAL HERNIA: ICD-10-CM

## 2025-05-27 LAB
ANION GAP SERPL CALC-SCNC: 6 MMOL/L (ref 0–18)
ANTIBODY SCREEN: NEGATIVE
ATRIAL RATE: 58 BPM
BASOPHILS # BLD AUTO: 0.05 X10(3) UL (ref 0–0.2)
BASOPHILS NFR BLD AUTO: 0.9 %
BUN BLD-MCNC: 10 MG/DL (ref 9–23)
CALCIUM BLD-MCNC: 9.2 MG/DL (ref 8.7–10.6)
CHLORIDE SERPL-SCNC: 109 MMOL/L (ref 98–112)
CO2 SERPL-SCNC: 27 MMOL/L (ref 21–32)
CREAT BLD-MCNC: 0.88 MG/DL (ref 0.7–1.3)
EGFRCR SERPLBLD CKD-EPI 2021: 87 ML/MIN/1.73M2 (ref 60–?)
EOSINOPHIL # BLD AUTO: 0.48 X10(3) UL (ref 0–0.7)
EOSINOPHIL NFR BLD AUTO: 8.4 %
ERYTHROCYTE [DISTWIDTH] IN BLOOD BY AUTOMATED COUNT: 13.2 %
FASTING STATUS PATIENT QL REPORTED: YES
GLUCOSE BLD-MCNC: 89 MG/DL (ref 70–99)
HCT VFR BLD AUTO: 41.8 % (ref 39–53)
HGB BLD-MCNC: 13.9 G/DL (ref 13–17.5)
IMM GRANULOCYTES # BLD AUTO: 0 X10(3) UL (ref 0–1)
IMM GRANULOCYTES NFR BLD: 0 %
LYMPHOCYTES # BLD AUTO: 1.35 X10(3) UL (ref 1–4)
LYMPHOCYTES NFR BLD AUTO: 23.6 %
MCH RBC QN AUTO: 34.2 PG (ref 26–34)
MCHC RBC AUTO-ENTMCNC: 33.3 G/DL (ref 31–37)
MCV RBC AUTO: 103 FL (ref 80–100)
MONOCYTES # BLD AUTO: 0.65 X10(3) UL (ref 0.1–1)
MONOCYTES NFR BLD AUTO: 11.4 %
NEUTROPHILS # BLD AUTO: 3.18 X10 (3) UL (ref 1.5–7.7)
NEUTROPHILS # BLD AUTO: 3.18 X10(3) UL (ref 1.5–7.7)
NEUTROPHILS NFR BLD AUTO: 55.7 %
OSMOLALITY SERPL CALC.SUM OF ELEC: 293 MOSM/KG (ref 275–295)
P AXIS: 59 DEGREES
P-R INTERVAL: 184 MS
PLATELET # BLD AUTO: 356 10(3)UL (ref 150–450)
POTASSIUM SERPL-SCNC: 4 MMOL/L (ref 3.5–5.1)
Q-T INTERVAL: 410 MS
QRS DURATION: 106 MS
QTC CALCULATION (BEZET): 402 MS
R AXIS: 16 DEGREES
RBC # BLD AUTO: 4.06 X10(6)UL (ref 3.8–5.8)
RH BLOOD TYPE: NEGATIVE
SODIUM SERPL-SCNC: 142 MMOL/L (ref 136–145)
T AXIS: 33 DEGREES
VENTRICULAR RATE: 58 BPM
WBC # BLD AUTO: 5.7 X10(3) UL (ref 4–11)

## 2025-05-27 PROCEDURE — 86900 BLOOD TYPING SEROLOGIC ABO: CPT

## 2025-05-27 PROCEDURE — 86901 BLOOD TYPING SEROLOGIC RH(D): CPT

## 2025-05-27 PROCEDURE — 85025 COMPLETE CBC W/AUTO DIFF WBC: CPT

## 2025-05-27 PROCEDURE — 80048 BASIC METABOLIC PNL TOTAL CA: CPT

## 2025-05-27 PROCEDURE — 93005 ELECTROCARDIOGRAM TRACING: CPT

## 2025-05-27 PROCEDURE — 93010 ELECTROCARDIOGRAM REPORT: CPT | Performed by: INTERNAL MEDICINE

## 2025-05-27 PROCEDURE — 36415 COLL VENOUS BLD VENIPUNCTURE: CPT

## 2025-05-27 PROCEDURE — 86850 RBC ANTIBODY SCREEN: CPT

## 2025-06-01 DIAGNOSIS — R10.13 EPIGASTRIC PAIN: ICD-10-CM

## 2025-06-02 ENCOUNTER — ANESTHESIA EVENT (OUTPATIENT)
Dept: SURGERY | Facility: HOSPITAL | Age: 80
End: 2025-06-02
Payer: MEDICARE

## 2025-06-02 ENCOUNTER — ANESTHESIA (OUTPATIENT)
Dept: SURGERY | Facility: HOSPITAL | Age: 80
End: 2025-06-02
Payer: MEDICARE

## 2025-06-02 ENCOUNTER — HOSPITAL ENCOUNTER (OUTPATIENT)
Facility: HOSPITAL | Age: 80
Discharge: HOME | End: 2025-06-03
Attending: THORACIC SURGERY (CARDIOTHORACIC VASCULAR SURGERY) | Admitting: THORACIC SURGERY (CARDIOTHORACIC VASCULAR SURGERY)
Payer: MEDICARE

## 2025-06-02 DIAGNOSIS — K44.9 PARAESOPHAGEAL HERNIA: Primary | ICD-10-CM

## 2025-06-02 DIAGNOSIS — Z01.818 PRE-OP TESTING: ICD-10-CM

## 2025-06-02 LAB — RH BLOOD TYPE: NEGATIVE

## 2025-06-02 PROCEDURE — 76942 ECHO GUIDE FOR BIOPSY: CPT | Performed by: STUDENT IN AN ORGANIZED HEALTH CARE EDUCATION/TRAINING PROGRAM

## 2025-06-02 PROCEDURE — 99204 OFFICE O/P NEW MOD 45 MIN: CPT | Performed by: HOSPITALIST

## 2025-06-02 DEVICE — PLEDGET SOFT 6X1X1/16
Type: IMPLANTABLE DEVICE | Site: ESOPHAGUS | Status: FUNCTIONAL
Brand: DEKNATEL

## 2025-06-02 RX ORDER — ACETAMINOPHEN 10 MG/ML
1000 INJECTION, SOLUTION INTRAVENOUS EVERY 6 HOURS
Status: DISCONTINUED | OUTPATIENT
Start: 2025-06-02 | End: 2025-06-03

## 2025-06-02 RX ORDER — BUPIVACAINE HYDROCHLORIDE 2.5 MG/ML
INJECTION, SOLUTION EPIDURAL; INFILTRATION; INTRACAUDAL; PERINEURAL AS NEEDED
Status: DISCONTINUED | OUTPATIENT
Start: 2025-06-02 | End: 2025-06-02 | Stop reason: SURG

## 2025-06-02 RX ORDER — SODIUM PHOSPHATE, DIBASIC AND SODIUM PHOSPHATE, MONOBASIC 7; 19 G/230ML; G/230ML
1 ENEMA RECTAL ONCE AS NEEDED
Status: DISCONTINUED | OUTPATIENT
Start: 2025-06-02 | End: 2025-06-03

## 2025-06-02 RX ORDER — ONDANSETRON 2 MG/ML
4 INJECTION INTRAMUSCULAR; INTRAVENOUS EVERY 6 HOURS PRN
Status: DISCONTINUED | OUTPATIENT
Start: 2025-06-02 | End: 2025-06-02 | Stop reason: HOSPADM

## 2025-06-02 RX ORDER — NALOXONE HYDROCHLORIDE 0.4 MG/ML
80 INJECTION, SOLUTION INTRAMUSCULAR; INTRAVENOUS; SUBCUTANEOUS AS NEEDED
Status: DISCONTINUED | OUTPATIENT
Start: 2025-06-02 | End: 2025-06-02 | Stop reason: HOSPADM

## 2025-06-02 RX ORDER — LIDOCAINE HYDROCHLORIDE 10 MG/ML
INJECTION, SOLUTION EPIDURAL; INFILTRATION; INTRACAUDAL; PERINEURAL AS NEEDED
Status: DISCONTINUED | OUTPATIENT
Start: 2025-06-02 | End: 2025-06-02 | Stop reason: SURG

## 2025-06-02 RX ORDER — ONDANSETRON 2 MG/ML
INJECTION INTRAMUSCULAR; INTRAVENOUS AS NEEDED
Status: DISCONTINUED | OUTPATIENT
Start: 2025-06-02 | End: 2025-06-02 | Stop reason: SURG

## 2025-06-02 RX ORDER — SODIUM CHLORIDE, SODIUM LACTATE, POTASSIUM CHLORIDE, CALCIUM CHLORIDE 600; 310; 30; 20 MG/100ML; MG/100ML; MG/100ML; MG/100ML
INJECTION, SOLUTION INTRAVENOUS CONTINUOUS
Status: DISCONTINUED | OUTPATIENT
Start: 2025-06-02 | End: 2025-06-02 | Stop reason: HOSPADM

## 2025-06-02 RX ORDER — FLUTICASONE PROPIONATE 50 MCG
1 SPRAY, SUSPENSION (ML) NASAL 2 TIMES DAILY
Status: DISCONTINUED | OUTPATIENT
Start: 2025-06-02 | End: 2025-06-03

## 2025-06-02 RX ORDER — DEXAMETHASONE SODIUM PHOSPHATE 10 MG/ML
INJECTION, SOLUTION INTRAMUSCULAR; INTRAVENOUS AS NEEDED
Status: DISCONTINUED | OUTPATIENT
Start: 2025-06-02 | End: 2025-06-02 | Stop reason: SURG

## 2025-06-02 RX ORDER — HEPARIN SODIUM 5000 [USP'U]/ML
5000 INJECTION, SOLUTION INTRAVENOUS; SUBCUTANEOUS ONCE
Status: COMPLETED | OUTPATIENT
Start: 2025-06-02 | End: 2025-06-02

## 2025-06-02 RX ORDER — METOCLOPRAMIDE HYDROCHLORIDE 5 MG/ML
10 INJECTION INTRAMUSCULAR; INTRAVENOUS EVERY 8 HOURS PRN
Status: DISCONTINUED | OUTPATIENT
Start: 2025-06-02 | End: 2025-06-02 | Stop reason: HOSPADM

## 2025-06-02 RX ORDER — BUPIVACAINE HYDROCHLORIDE 2.5 MG/ML
INJECTION, SOLUTION EPIDURAL; INFILTRATION; INTRACAUDAL; PERINEURAL AS NEEDED
Status: DISCONTINUED | OUTPATIENT
Start: 2025-06-02 | End: 2025-06-02 | Stop reason: HOSPADM

## 2025-06-02 RX ORDER — SODIUM CHLORIDE 9 MG/ML
INJECTION, SOLUTION INTRAVENOUS CONTINUOUS
Status: DISCONTINUED | OUTPATIENT
Start: 2025-06-02 | End: 2025-06-03

## 2025-06-02 RX ORDER — HYDROMORPHONE HYDROCHLORIDE 1 MG/ML
INJECTION, SOLUTION INTRAMUSCULAR; INTRAVENOUS; SUBCUTANEOUS
Status: COMPLETED
Start: 2025-06-02 | End: 2025-06-02

## 2025-06-02 RX ORDER — BISACODYL 10 MG
10 SUPPOSITORY, RECTAL RECTAL
Status: DISCONTINUED | OUTPATIENT
Start: 2025-06-02 | End: 2025-06-03

## 2025-06-02 RX ORDER — PANTOPRAZOLE SODIUM 40 MG/1
40 TABLET, DELAYED RELEASE ORAL
Qty: 90 TABLET | Refills: 0 | Status: SHIPPED | OUTPATIENT
Start: 2025-06-02

## 2025-06-02 RX ORDER — HYDROCODONE BITARTRATE AND ACETAMINOPHEN 5; 325 MG/1; MG/1
1 TABLET ORAL ONCE AS NEEDED
Status: DISCONTINUED | OUTPATIENT
Start: 2025-06-02 | End: 2025-06-02 | Stop reason: HOSPADM

## 2025-06-02 RX ORDER — ROCURONIUM BROMIDE 10 MG/ML
INJECTION, SOLUTION INTRAVENOUS AS NEEDED
Status: DISCONTINUED | OUTPATIENT
Start: 2025-06-02 | End: 2025-06-02 | Stop reason: SURG

## 2025-06-02 RX ORDER — ACETAMINOPHEN 500 MG
1000 TABLET ORAL ONCE
Status: DISCONTINUED | OUTPATIENT
Start: 2025-06-02 | End: 2025-06-02 | Stop reason: HOSPADM

## 2025-06-02 RX ORDER — ACETAMINOPHEN 500 MG
1000 TABLET ORAL ONCE AS NEEDED
Status: DISCONTINUED | OUTPATIENT
Start: 2025-06-02 | End: 2025-06-02 | Stop reason: HOSPADM

## 2025-06-02 RX ORDER — SODIUM CHLORIDE 9 MG/ML
INJECTION, SOLUTION INTRAVENOUS CONTINUOUS PRN
Status: DISCONTINUED | OUTPATIENT
Start: 2025-06-02 | End: 2025-06-02 | Stop reason: SURG

## 2025-06-02 RX ORDER — HYDROMORPHONE HYDROCHLORIDE 1 MG/ML
0.4 INJECTION, SOLUTION INTRAMUSCULAR; INTRAVENOUS; SUBCUTANEOUS EVERY 2 HOUR PRN
Status: DISCONTINUED | OUTPATIENT
Start: 2025-06-02 | End: 2025-06-03

## 2025-06-02 RX ORDER — HYDROMORPHONE HYDROCHLORIDE 1 MG/ML
0.4 INJECTION, SOLUTION INTRAMUSCULAR; INTRAVENOUS; SUBCUTANEOUS EVERY 5 MIN PRN
Status: DISCONTINUED | OUTPATIENT
Start: 2025-06-02 | End: 2025-06-02 | Stop reason: HOSPADM

## 2025-06-02 RX ORDER — ONDANSETRON 2 MG/ML
4 INJECTION INTRAMUSCULAR; INTRAVENOUS EVERY 6 HOURS PRN
Status: DISCONTINUED | OUTPATIENT
Start: 2025-06-02 | End: 2025-06-03

## 2025-06-02 RX ORDER — HYDROMORPHONE HYDROCHLORIDE 1 MG/ML
0.3 INJECTION, SOLUTION INTRAMUSCULAR; INTRAVENOUS; SUBCUTANEOUS EVERY 5 MIN PRN
Status: DISCONTINUED | OUTPATIENT
Start: 2025-06-02 | End: 2025-06-02 | Stop reason: HOSPADM

## 2025-06-02 RX ORDER — LABETALOL HYDROCHLORIDE 5 MG/ML
5 INJECTION, SOLUTION INTRAVENOUS EVERY 5 MIN PRN
Status: DISCONTINUED | OUTPATIENT
Start: 2025-06-02 | End: 2025-06-02 | Stop reason: HOSPADM

## 2025-06-02 RX ORDER — HYDROMORPHONE HYDROCHLORIDE 1 MG/ML
0.2 INJECTION, SOLUTION INTRAMUSCULAR; INTRAVENOUS; SUBCUTANEOUS EVERY 5 MIN PRN
Status: DISCONTINUED | OUTPATIENT
Start: 2025-06-02 | End: 2025-06-02 | Stop reason: HOSPADM

## 2025-06-02 RX ORDER — LIDOCAINE HYDROCHLORIDE 40 MG/ML
SOLUTION TOPICAL AS NEEDED
Status: DISCONTINUED | OUTPATIENT
Start: 2025-06-02 | End: 2025-06-02 | Stop reason: SURG

## 2025-06-02 RX ORDER — SODIUM CHLORIDE, SODIUM LACTATE, POTASSIUM CHLORIDE, CALCIUM CHLORIDE 600; 310; 30; 20 MG/100ML; MG/100ML; MG/100ML; MG/100ML
INJECTION, SOLUTION INTRAVENOUS CONTINUOUS
Status: DISCONTINUED | OUTPATIENT
Start: 2025-06-02 | End: 2025-06-03

## 2025-06-02 RX ORDER — METOCLOPRAMIDE HYDROCHLORIDE 5 MG/ML
10 INJECTION INTRAMUSCULAR; INTRAVENOUS EVERY 8 HOURS PRN
Status: DISCONTINUED | OUTPATIENT
Start: 2025-06-02 | End: 2025-06-03

## 2025-06-02 RX ORDER — HYDROCODONE BITARTRATE AND ACETAMINOPHEN 5; 325 MG/1; MG/1
2 TABLET ORAL ONCE AS NEEDED
Status: DISCONTINUED | OUTPATIENT
Start: 2025-06-02 | End: 2025-06-02 | Stop reason: HOSPADM

## 2025-06-02 RX ORDER — HEPARIN SODIUM 5000 [USP'U]/ML
5000 INJECTION, SOLUTION INTRAVENOUS; SUBCUTANEOUS EVERY 12 HOURS SCHEDULED
Status: DISCONTINUED | OUTPATIENT
Start: 2025-06-02 | End: 2025-06-03

## 2025-06-02 RX ORDER — HYDROMORPHONE HYDROCHLORIDE 1 MG/ML
0.8 INJECTION, SOLUTION INTRAMUSCULAR; INTRAVENOUS; SUBCUTANEOUS EVERY 2 HOUR PRN
Status: DISCONTINUED | OUTPATIENT
Start: 2025-06-02 | End: 2025-06-03

## 2025-06-02 RX ADMIN — SODIUM CHLORIDE: 9 INJECTION, SOLUTION INTRAVENOUS at 10:29:00

## 2025-06-02 RX ADMIN — SODIUM CHLORIDE, SODIUM LACTATE, POTASSIUM CHLORIDE, CALCIUM CHLORIDE: 600; 310; 30; 20 INJECTION, SOLUTION INTRAVENOUS at 10:29:00

## 2025-06-02 RX ADMIN — SODIUM CHLORIDE: 9 INJECTION, SOLUTION INTRAVENOUS at 07:41:00

## 2025-06-02 RX ADMIN — ROCURONIUM BROMIDE 30 MG: 10 INJECTION, SOLUTION INTRAVENOUS at 08:45:00

## 2025-06-02 RX ADMIN — ROCURONIUM BROMIDE 20 MG: 10 INJECTION, SOLUTION INTRAVENOUS at 09:40:00

## 2025-06-02 RX ADMIN — ONDANSETRON 4 MG: 2 INJECTION INTRAMUSCULAR; INTRAVENOUS at 10:25:00

## 2025-06-02 RX ADMIN — LIDOCAINE HYDROCHLORIDE 4 ML: 40 SOLUTION TOPICAL at 07:37:00

## 2025-06-02 RX ADMIN — ROCURONIUM BROMIDE 50 MG: 10 INJECTION, SOLUTION INTRAVENOUS at 07:51:00

## 2025-06-02 RX ADMIN — LIDOCAINE HYDROCHLORIDE 50 MG: 10 INJECTION, SOLUTION EPIDURAL; INFILTRATION; INTRACAUDAL; PERINEURAL at 07:36:00

## 2025-06-02 RX ADMIN — BUPIVACAINE HYDROCHLORIDE 60 ML: 2.5 INJECTION, SOLUTION EPIDURAL; INFILTRATION; INTRACAUDAL; PERINEURAL at 07:46:00

## 2025-06-02 RX ADMIN — SODIUM CHLORIDE, SODIUM LACTATE, POTASSIUM CHLORIDE, CALCIUM CHLORIDE: 600; 310; 30; 20 INJECTION, SOLUTION INTRAVENOUS at 07:30:00

## 2025-06-02 RX ADMIN — DEXAMETHASONE SODIUM PHOSPHATE 4 MG: 10 INJECTION, SOLUTION INTRAMUSCULAR; INTRAVENOUS at 07:46:00

## 2025-06-02 NOTE — ANESTHESIA PROCEDURE NOTES
Airway  Date/Time: 6/2/2025 7:37 AM  Reason: elective    Airway not difficult    General Information and Staff   Patient location during procedure: OR  Anesthesiologist: Jyason Hoffman MD  Performed: anesthesiologist   Performed by: Jayson Hoffman MD  Authorized by: Jayson Hoffman MD        Indications and Patient Condition  Indications for airway management: anesthesia  Sedation level: deep      Preoxygenated: yesPatient position: sniffing    Mask difficulty assessment: 0 - not attempted    Final Airway Details    Final airway type: endotracheal airway    Successful airway: ETT  Cuffed: yes   Successful intubation technique: direct laryngoscopy  Facilitating devices/methods: intubating stylet and rapid sequence intubation  Endotracheal tube insertion site: oral  Blade: Megha  Blade size: #4  ETT size (mm): 7.5    Cormack-Lehane Classification: grade IIA - partial view of glottis  Placement verified by: capnometry   Measured from: lips  ETT to lips (cm): 23  Number of attempts at approach: 1  Number of other approaches attempted: 0    Additional Comments    Teeth intact post-procedure.

## 2025-06-02 NOTE — H&P
Thoracic Surgery H&P Note     Name: Cruz Krishnan   Age: 80 year old   Sex: male.   MRN: [id number]    Reason for Consultation: hiatal hernia    Consulting Physician: Dr. Webb     Subjective:     Chief Complaint: \"I'm ready for surgery\"     History of Present Illness:   Mr. Krishnan is a 80 year old male former smoker (quit 20 years ago) presenting with a hiatal hernia.      Patient has known about this hiatal hernia for the past 20 years. However, he was asymptomatic until the past year. EGD for worsening symptoms from 3/20/25 showed a large 10cm paraesophageal hernia. Biopsies negative for dysplasia or malignancy. Patient was referred by Dr. Webb to discuss surgery.      Patient reports worsening heartburn over the past year. Prior, he was asymptomatic. He feels the pain in the center of his chest, it is worse after eating, and with certain foods such as chocolate. Occasional vomiting of clear phlegm. He denies dysphagia. He has a productive cough in the morning. He denies shortness of breath, chest pain, fevers, chills, weight loss.  He is taking pantoprazole twice daily now with improvement in his symptoms.     PMH includes bladder cancer 6 years ago. No history of heart disease. No blood thinners. No prior thoracic surgeries or abdominal surgeries. Patient denies family history of cancer.     Review Of Systems:   10 point review of systems was conducted and was negative except for the pertinent positives listed in the above HPI.    Past Medical History: Past Medical History[1]    Past Surgical History: Past Surgical History[2]    Social History:   Social History     Socioeconomic History    Marital status:      Spouse name: Not on file    Number of children: Not on file    Years of education: Not on file    Highest education level: Not on file   Occupational History    Not on file   Tobacco Use    Smoking status: Former     Current packs/day: 0.00     Average packs/day: 1 pack/day for 22.0  years (22.0 ttl pk-yrs)     Types: Cigarettes     Quit date: 1985     Years since quittin.4     Passive exposure: Past    Smokeless tobacco: Never   Vaping Use    Vaping status: Never Used   Substance and Sexual Activity    Alcohol use: Yes     Alcohol/week: 5.0 standard drinks of alcohol     Types: 3 Glasses of wine, 2 Cans of beer per week    Drug use: Never    Sexual activity: Not on file   Other Topics Concern    Not on file   Social History Narrative    Not on file     Social Drivers of Health     Food Insecurity: No Food Insecurity (3/25/2025)    NCSS - Food Insecurity     Worried About Running Out of Food in the Last Year: No     Ran Out of Food in the Last Year: No   Transportation Needs: No Transportation Needs (3/25/2025)    NCSS - Transportation     Lack of Transportation: No   Housing Stability: Low Risk  (2022)    Received from [facility]    Housing Stability     Are you concerned about having a safe and reliable place to live?: No       Family History: Family History[3]    Allergies:  Allergies[4]    Medications:   Medications - Current[5]  Current Medications[6]      Objective:      Vital Signs:  /76 (BP Location: Right arm)   Pulse 64   Temp 97.7 °F (36.5 °C) (Oral)   Resp 16   Ht 6'   Wt 201 lb   SpO2 98%   BMI 27.26 kg/m²     Physical Exam:  General: well appearing male in no acute distress  HEENT: Normocephalic, PERRL, EOMI, no scleral icterus  Neck: Supple, trachea midline, no JVD, no masses. Thyroid not grossly enlarged  Nodes: no cervical or supraclavicular lymphadenopathy appreciated  Heart: regular rate and rhythm. No murmurs, rubs or gallops. No lower extremity edema.  Lungs: Normal respiratory effort. Clear to ascultation bilaterally.   Abdomen: Soft, Non-tender, non-distended. No hepatosplenomegaly noted.  Extremities: No clubbing or cyanosis. No lateralizing weakness  Neuro: No gross cranial nerve defects, no loss of sensation  Psych:   oriented to person place and time, normal mood and affect      Labs:   Lab Results   Component Value Date/Time    WBC 5.7 05/27/2025 09:29 AM    HGB 13.9 05/27/2025 09:29 AM    HCT 41.8 05/27/2025 09:29 AM    .0 05/27/2025 09:29 AM    .0 (H) 05/27/2025 09:29 AM     Lab Results   Component Value Date/Time     05/27/2025 09:29 AM    K 4.0 05/27/2025 09:29 AM     05/27/2025 09:29 AM    CO2 27.0 05/27/2025 09:29 AM    BUN 10 05/27/2025 09:29 AM    GLU 89 05/27/2025 09:29 AM    CA 9.2 05/27/2025 09:29 AM     No results found for: \"INR\", \"PT\", \"PTT\"  No components found for: \"TROPI\"  Lab Results   Component Value Date/Time    ALB 3.6 02/20/2024 11:50 AM    TP 7.8 02/20/2024 11:50 AM    ALT 29 02/20/2024 11:50 AM    AST 35 02/20/2024 11:50 AM         Review of Data:   EGD 3/20/25  Findings:  1. Esophagus: Normal.  2. EGJ: Large 10 cm paraesophageal hernia  3. Stomach: Normal  4. Duodenum: Normal     CT Abdomen 3/5/2024  FINDINGS:    LUNG BASE:  There is a large hiatal hernia.  Scattered atelectasis and/or scarring.  LIVER:  Unremarkable.  BILIARY:  Unremarkable.  SPLEEN:  Unremarkable.  PANCREAS:  Unremarkable.  ADRENALS:  Unremarkable.  KIDNEYS:  Unremarkable.  AORTA/VASCULAR:  Scattered atherosclerosis.  BONES:  Degenerative changes in the spine.      Impression   CONCLUSION:  Large hiatal hernia.       Assessment/Plan:     Mr. Krishnan is a 80 year old male former smoker (quit 20 years ago) presenting with a hiatal hernia.      Patient has known about his hiatal hernia for years. However, he was asymptomatic until the past year. EGD for worsening symptoms from 3/20/25 showed a large 10cm paraesophageal hernia. Biopsies negative for dysplasia or malignancy. Patient was referred by Dr. Webb to discuss surgery.      Discussed options including robotic assisted hiatal hernia repair and fundoplication. Discussed the risks and benefits of surgery. Given the size of his hernia and progressive  symptoms, we feel he is a candidate for surgery. During surgery, he may require possible mesh placement or possible charla gastroplasty. Patient expressed understanding and would like to proceed with surgery.      -Plan to proceed with robotic assisted hiatal hernia repair and fundoplication, possible mesh placement, possible charla gastroplasty today with CYNDI RyanC  Thoracic Surgery           [1]   Past Medical History:   Abdominal hernia    Abdominal pain    Anesthesia complication    difficult to wake up    Anxiety    Arthritis    Back pain    Belching    Calculus of kidney    Cancer (HCC)    bladder diagnosed 1972- chemo and radiation until  1977    Cataract    Chronic cough    Diarrhea, unspecified    Exposure to medical diagnostic radiation    COMPLETED 1977    Fatigue    Heartburn    Indigestion    Osteoarthritis    Pain in joints    Personal history of antineoplastic chemotherapy    COMPLETED 1977    Shortness of breath    Sleep disturbance    Stress    Uncomfortable fullness after meals    Visual impairment    glasses    Vomiting    Wears glasses    Weight gain   [2]   Past Surgical History:  Procedure Laterality Date    Cataracts, ophth (internal)      Colonoscopy      Knee replacement surgery      Skin surgery  02/22/2021    En face stage 1 - right nasal ala -Dr. GRACE Felix    [3]   Family History  Problem Relation Age of Onset    Alcohol and Other Disorders Associated Father         Alcoholism   [4]   Allergies  Allergen Reactions    Medrol [Methylprednisolone] SWELLING, DIZZINESS and RESTLESSNESS     Only oral tablets    [5] No current outpatient medications on file.  [6]    acetaminophen (Tylenol Extra Strength) tab 1,000 mg  1,000 mg Oral Once    lactated ringers infusion   Intravenous Continuous    [COMPLETED] heparin (Porcine) 5000 UNIT/ML injection 5,000 Units  5,000 Units Subcutaneous Once    ceFAZolin (Ancef) 2g in 10mL IV syringe premix  2 g Intravenous Once

## 2025-06-02 NOTE — OPERATIVE REPORT
[facility]    PATIENT'S NAME: KLAUDIA SLOAN   ATTENDING PHYSICIAN: Triston Hogan Jr, MD   OPERATING PHYSICIAN: Triston Hogan Jr, MD   PATIENT ACCOUNT#:   [id number]    LOCATION:  PACU  PACU 3 [facility] 10  MEDICAL RECORD #:   [id number]       YOB: 1945  ADMISSION DATE:       06/02/2025      OPERATION DATE:  06/02/2025    OPERATIVE REPORT      PREOPERATIVE DIAGNOSIS:  Large symptomatic paraesophageal hernia.  POSTOPERATIVE DIAGNOSIS:  Large symptomatic paraesophageal hernia.  PROCEDURE:    1.   Robot-assisted reduction of hiatal hernia.  2.   Closure of esophageal hiatus.  3.   Ulises fundoplication.    ASSISTANT:  Jessica Santana PA-C    ANESTHESIA:  General dual lumen endotracheal anesthesia.    ANESTHESIOLOGIST:  Jayson Hoffman MD    SPECIMEN:  Hernia sac.    DRAINS:  None.    IMPLANTS:  None.    ESTIMATED BLOOD LOSS:  10 mL.    COMPLICATIONS:  None.    CONDITION:  Stable, to PACU.     INDICATIONS:  The patient is an 80-year-old man with a long history of a hiatal hernia.  This was largely asymptomatic for many years.  In the past year, he has noticed a significant increase in his symptoms.  This includes daily heartburn as well as occasional vomiting.  Due to the symptoms and the size of the hernia, he was referred for surgical correction.      FINDINGS:  The entirety of the stomach came down from the chest without difficulty.  After mobilization, there was over 3 cm of intraabdominal esophagus.  The hiatus came together without tension and a Ulises fundoplication performed.    OPERATIVE TECHNIQUE:  The patient was brought to the operating room, laid supine, underwent general endotracheal anesthesia.  A footboard was placed and arms tucked to the side.  All pressure points were padded.  He was prepped and draped in a sterile fashion.  Time-out was performed to confirm patient and site of surgery.  I made a 2 mm incision at Arango's point and entered the abdomen using a Veress  needle.  Once clicks associated with the needle getting past the abdominal fascia were appreciated, I did a saline drop test.  This confirmed safe entry into the abdomen and CO2 insufflation to a pressure of 15 mmHg was begun.  The CO2 went in with low pressures.  Once we got to 15 mmHg, I used an Optiview-type robotic trocar to enter the abdomen superior to the umbilicus just to the left of the midline.  Visually confirmed entry into the abdomen.  Once this was done, I used a scope to watch as additional 8 mm robotic trocars were placed.  Four additional trocars were placed through for additional robotic arms, 1 is assistant port and, finally, a 5 mm port was placed for a liver retractor.  The patient was placed in 18 degrees of reverse Trendelenburg, and a liver retractor was placed under the left lobe of the liver until the hiatus could be clearly seen.  At this point, I docked the da Anirudh xi robot.  I then placed instruments under direct visualization and once this was done moved to the robotic console.  My instruments for the mobilization part of the case were Cadiere forceps, robotic vessel sealer, and a tip-up fenestrated grasper.  Using these, I was able to bring the entirety of the stomach into the abdomen from the chest.  This went without difficulty.  I began to transect the pleural reflection along the right crura after entering the pars flaccida with the vessel sealer.  In transecting the peritoneal reflection, I was able to identify the hernia sac and began to take this down around the esophageal hiatus going over the top, releasing the phrenoesophageal ligament, and continuing down to the left side.  Once I got to the left side, I held up the fundus and began transecting the omentum off of the greater curve near the fundus.  I did this with a vessel sealer going cephalad.  Any short gastrics were taken with the vessel sealer.  Any points of resistance around the left hiatus were released as well.   Once the entirety of the hernia sac was mobilized, I transected it.  It was later removed from the abdomen using an anchor bag.  With the hernia sac removed, I began dissection around the esophagus.  I first did this near the GE junction which allowed me to pass a Penrose drain.  This acted as a handle for additional dissection going up into the chest.  In doing this, I identified the vagus nerves and protected them.  I continued my circumferential dissection of the esophagus up into the chest until I confirmed that I had over 3 cm of intraabdominal esophagus that was tension free.  Once I accomplished this, I began my hiatal closure.  I used 0 Ethibond suture with Hood pledgets and closed the posterior aspect of the hiatus with interrupted figure-of-eight sutures.  Two of these were done.  I did a third suture which was a horizontal mattress suture at the anterior aspect of the hiatus.  After doing this, the hiatus had come together nicely without tension and it was snug around the esophagus with only enough additional room for food boluses to pass.  With this done, I turned my attention to the fundoplication.  For this, I used 2-0 Ethibond suture.  I did 3 sutures along the left side of the esophagus, suturing it to the fundus.  The third stitch included esophagus, left crura, and fundus.  This acted as a hinge over which I was able to then rotate the fundus 180 degrees.  I then secured the fundus to the top of the hiatus at approximately 10 and 2 with additional 2-0 Ethibond sutures.  I then did 2 additional sutures along the right-hand side, the first to the right crura of the esophagus and the fundus and the final stitch to the right crura and the fundus.  This created a good 180-degree wrap without any tension.  At this point, I looked for any bleeding.  None was seen.  I then withdrew my liver retractor and all trocars under direct visualization and upon seeing no bleeding, I let all of the CO2 insufflation  out of the abdomen and closed incisions with a 4-0 Monocryl subcuticular stitch.  The patient tolerated the procedure well.  I was present for the entirety of the procedure.    Dictated By Triston Hogan Jr, MD  d: 06/02/2025 11:02:48  t: 06/02/2025 11:26:33  [facility] [id number]  JLL/

## 2025-06-02 NOTE — BRIEF OP NOTE
Pre-Operative Diagnosis: HIATAL HERNIA     Post-Operative Diagnosis: same     Procedure Performed:   XI ROBOT-ASSISTED HIATAL HERNIA REPAIR AND FUNDOPLICATION    Surgeons and Role:     * Triston Hogan Jr., MD - Primary    Assistant(s):  PA: Jessica Santana PA-C     Surgical Findings: large hiatal hernia as expected     Specimen:   ID Type Source Tests Collected by Time Destination   1 : hernia sac Tissue Hernia SURGICAL PATHOLOGY TISSUE Triston Hogan Jr., MD 6/2/2025 0921      Estimated Blood Loss: Blood Output: 10 mL (6/2/2025 10:33 AM)    Disposition: PACU to floor     Jessica Santana PA-C  6/2/2025  10:43 AM

## 2025-06-02 NOTE — ANESTHESIA PREPROCEDURE EVALUATION
PRE-OP EVALUATION    Patient Name: Cruz Krishnan    Admit Diagnosis: HIATAL HERNIA    Pre-op Diagnosis: HIATAL HERNIA    XI ROBOT-ASSISTED HIATAL HERNIA REPAIR AND FUNDOPLICATION, POSSIBLE MESH PLACEMENT , POSSIBLE VALENTIN GASTROPLASTY. ESOPHAGAGASTROSCOPY    Anesthesia Procedure: XI ROBOT-ASSISTED HIATAL HERNIA REPAIR AND FUNDOPLICATION, POSSIBLE MESH PLACEMENT , POSSIBLE VALENTIN GASTROPLASTY. ESOPHAGAGASTROSCOPY    Surgeons and Role:     * Edison Briceno, Triston HUBBARD MD - Primary    Pre-op vitals reviewed.  Temp: 97.7 °F (36.5 °C)  Pulse: 64  Resp: 16  BP: 153/76  SpO2: 98 %  Body mass index is 27.26 kg/m².    Current medications reviewed.  Hospital Medications:  Current Medications[1]    Outpatient Medications:   Prescriptions Prior to Admission[2]    Allergies: Medrol [methylprednisolone]      Anesthesia Evaluation    Patient summary reviewed.    Anesthetic Complications  (-) history of anesthetic complications         GI/Hepatic/Renal      (+) GERD       (+) chronic renal disease and CRI                   Cardiovascular        Exercise tolerance: good     MET: >4    (-) obesity  (-) hypertension     (-) CAD                  (-) angina     (-) FERNANDEZ         Endo/Other      (-) diabetes                            Pulmonary      (-) asthma  (-) COPD       (-) shortness of breath     (-) sleep apnea       Neuro/Psych        (+) anxiety                      Patient Active Problem List:     Melanoma in situ of nose (HCC)     Purpura     Smokers' cough (HCC)     Depressive disorder     Anxiety     Elevated blood pressure reading without diagnosis of hypertension     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  Lab Results   Component Value Date    WBC 5.7 05/27/2025    RBC 4.06 05/27/2025    HGB 13.9 05/27/2025    HCT 41.8 05/27/2025    .0 (H) 05/27/2025    MCH 34.2 (H) 05/27/2025    MCHC 33.3 05/27/2025    RDW 13.2 05/27/2025    .0 05/27/2025     Lab Results   Component Value Date     05/27/2025    K 4.0 05/27/2025     05/27/2025    CO2 27.0 05/27/2025    BUN 10 05/27/2025    CREATSERUM 0.88 05/27/2025    GLU 89 05/27/2025    CA 9.2 05/27/2025            Airway      Mallampati: II  Mouth opening: >3 FB  TM distance: 4 - 6 cm  Neck ROM: full Cardiovascular    Cardiovascular exam normal.  Rhythm: regular  Rate: normal     Dental  Comment: Patient denies any loose/missing/cracked teeth. No gross abnormalities or loose teeth noted on exam.      Dentition appears grossly intact         Pulmonary    Pulmonary exam normal.                 Other findings              ASA: 2   Plan: general  NPO status verified and patient meets guidelines.    Post-procedure pain management plan discussed with surgeon and patient.  Surgeon requests: regional block  Comment:       A detailed discussion about the anesthetic plan was held with Cruz Krishnan in the preoperative area. Discussed benefits and risks of general anesthesia including, reasonable expectations of post-operative pain, nausea, vomiting, injury to lips, gums, tongue, teeth, eyes, awareness under anesthesia, cardiac, pulmonary, aspiration, stroke, and death. All questions were answered appropriately and patient demonstrated understanding of realistic expectations and risks of undergoing anesthesia. Cruz Krishnan consents to receiving anesthesia and wishes to proceed.    I also discussed with Cruz Krishnan benefits of TAP nerve blocks including improved pain control following the procedure however there will likely  be need for additional pain medicines. We discussed risks as well, including failed block,, prolonged abdominal numbness. Other very rare complications such as local anesthetic systemic toxicity (LAST), infection, hematoma formation, and permanent nerve damage was also discussed. All questions were answered and Cruz VINCENT Ariella agreed to proceed.               Plan/risks discussed with: patient                Present on Admission:  **None**             [1]    acetaminophen (Tylenol Extra Strength) tab 1,000 mg  1,000 mg Oral Once    lactated ringers infusion   Intravenous Continuous    [COMPLETED] heparin (Porcine) 5000 UNIT/ML injection 5,000 Units  5,000 Units Subcutaneous Once    ceFAZolin (Ancef) 2g in 10mL IV syringe premix  2 g Intravenous Once   [2]   Medications Prior to Admission   Medication Sig Dispense Refill Last Dose/Taking    venlafaxine 75 MG Oral Tab TAKE 1 TABLET BY MOUTH TWICE A  tablet 1 2025    PANTOPRAZOLE 40 MG Oral Tab EC TAKE 1 TABLET BY MOUTH EVERY DAY IN THE MORNING BEFORE BREAKFAST 90 tablet 0 2025   [3]   Past Surgical History:  Procedure Laterality Date    Cataracts, ophth (internal)      Colonoscopy      Knee replacement surgery      Skin surgery  2021    En face stage 1 - right nasal ala -Dr. GRACE Felix    [4]   Social History  Socioeconomic History    Marital status:    Tobacco Use    Smoking status: Former     Current packs/day: 0.00     Average packs/day: 1 pack/day for 22.0 years (22.0 ttl pk-yrs)     Types: Cigarettes     Quit date: 1985     Years since quittin.4     Passive exposure: Past    Smokeless tobacco: Never   Vaping Use    Vaping status: Never Used   Substance and Sexual Activity    Alcohol use: Yes     Alcohol/week: 5.0 standard drinks of alcohol     Types: 3 Glasses of wine, 2 Cans of beer per week    Drug use: Never

## 2025-06-02 NOTE — PLAN OF CARE
Oriented to room  Call light is in reach  Navigator admission completed    AAO x 4, VSS  NSR on tele, on RA  Denied pain or SOB  IVF infusing per order  SCD's applied to Bilat LE  Voiding adequately via urinal  Comfort and safety rounds done  NPO now. Upper GI series tomorrow  7 lap sites found on abdomen with steri-strips  Patient and spouse updated on plan of care

## 2025-06-02 NOTE — ANESTHESIA PROCEDURE NOTES
Peripheral IV  Date/Time: 6/2/2025 7:41 AM  Inserted by: Jayson Hoffman MD    Placement  Needle size: 16 G  Laterality: left  Location: hand  Site prep: alcohol  Technique: anatomical landmarks  Attempts: 1

## 2025-06-02 NOTE — ANESTHESIA PROCEDURE NOTES
Regional Block    Date/Time: 6/2/2025 7:40 AM    Performed by: Jayson Hoffman MD  Authorized by: Jayson Hoffman MD      General Information and Staff    Start Time:  6/2/2025 7:40 AM  End Time:  6/2/2025 7:46 AM  Anesthesiologist:  Jayson Hoffman MD  Performed by:  Anesthesiologist  Patient Location:  OR    Block Placement: Post Induction  Site Identification: real time ultrasound guided and image stored and retrievable    Block site/laterality marked before start: site marked  Reason for Block: at surgeon's request and post-op pain management    Preanesthetic Checklist: 2 patient identifers, IV checked, risks and benefits discussed, monitors and equipment checked, pre-op evaluation, timeout performed, anesthesia consent, sterile technique used, no prohibitive neurological deficits and no local skin infection at insertion site      Procedure Details    Patient Position:  Supine  Prep: ChloraPrep    Monitoring:  Cardiac monitor, continuous pulse ox, blood pressure cuff and heart rate  Block Type:  TAP  Laterality:  Bilateral  Injection Technique:  Single-shot    Needle    Needle Type:  Short-bevel and echogenic  Needle Gauge:  21 G  Needle Length:  110 mm  Needle Localization:  Ultrasound guidance  Reason for Ultrasound Use: appropriate spread of the medication was noted in real time and no ultrasound evidence of intravascular and/or intraneural injection            Assessment    Injection Assessment:  Good spread noted, negative resistance, negative aspiration for heme, incremental injection and low pressure  Heart Rate Change: No    - Patient tolerated block procedure well without evidence of immediate block related complications.     Medications  6/2/2025 7:40 AM      Additional Comments    Bupivacaine 0.25% 30mL with decadron PF 2mg to each side

## 2025-06-02 NOTE — CONSULTS
[place] [facility]  CONSULT     Cruz Krishnan Patient Status:  Outpatient in a Bed    1945 MRN [id number]   Location [facility] 7NE-A Attending Edison Briceno, Triston HUBBARD MD   Hosp Day # 0 PCP Tevin Kiser MD     Reason for consult: GERD    Requested by: Dr. Hogan    Subjective:   History of Present Illness:     Cruz Krishnan is a 80 year old male with a history of large paraesophageal hernia who underwent robot-assisted reduction of hiatal hernia, closure of esophageal hiatus and Ulises fundoplication 2025. Additional history is that of bladder cancer, anxiety and chronic sinusitis. Patient complains of chest discomfort. No difficulty breathing. No nausea or vomiting. On room air. Comfortable. Last BM today.     History/Other:    Past Medical History:  Past Medical History:    Abdominal hernia    Abdominal pain    Anesthesia complication    difficult to wake up    Anxiety    Arthritis    Back pain    Belching    Calculus of kidney    Cancer (HCC)    bladder diagnosed - chemo and radiation until      Cataract    Chronic cough    Diarrhea, unspecified    Exposure to medical diagnostic radiation    COMPLETED     Fatigue    Heartburn    Indigestion    Osteoarthritis    Pain in joints    Personal history of antineoplastic chemotherapy    COMPLETED     Shortness of breath    Sleep disturbance    Stress    Uncomfortable fullness after meals    Visual impairment    glasses    Vomiting    Wears glasses    Weight gain     Past Surgical History:   Past Surgical History:   Procedure Laterality Date    Cataracts, ophth (internal)      Colonoscopy      Knee replacement surgery      Skin surgery  2021    En face stage 1 - right nasal ala -Dr. GRACE Felix       Family History:   Family History   Problem Relation Age of Onset    Alcohol and Other Disorders Associated Father         Alcoholism     Social History:    reports that he quit smoking about 40 years ago. His smoking use included  cigarettes. He has a 22 pack-year smoking history. He has been exposed to tobacco smoke. He has never used smokeless tobacco. He reports current alcohol use of about 5.0 standard drinks of alcohol per week. He reports that he does not use drugs.     Allergies:   Allergies   Allergen Reactions    Medrol [Methylprednisolone] SWELLING, DIZZINESS and RESTLESSNESS     Only oral tablets        Medications:    No current facility-administered medications on file prior to encounter.     Current Outpatient Medications on File Prior to Encounter   Medication Sig Dispense Refill    venlafaxine 75 MG Oral Tab TAKE 1 TABLET BY MOUTH TWICE A  tablet 1       Review of Systems:   A comprehensive review of systems was completed.    Pertinent positives and negatives noted in the HPI.    Objective:   Physical Exam:    BP (!) 151/91 (BP Location: Right arm)   Pulse 76   Temp 97.9 °F (36.6 °C) (Oral)   Resp 11   Ht 6' (1.829 m)   Wt 201 lb (91.2 kg)   SpO2 94%   BMI 27.26 kg/m²   General: No acute distress, Alert  Respiratory: No rhonchi, no wheezes  Cardiovascular: S1, S2. Regular rate and rhythm  Abdomen: Soft, hypoactive   Neuro: No new focal deficits  Extremities: No edema    Results:    Labs:      Labs Last 24 Hours:  Recent Labs   Lab 05/27/25  0929   WBC 5.7   HGB 13.9   .0*   .0       Recent Labs   Lab 05/27/25  0929   GLU 89   BUN 10   CREATSERUM 0.88   CA 9.2      K 4.0      CO2 27.0       No results for input(s): \"PTP\", \"INR\" in the last 168 hours.    No results for input(s): \"TROP\", \"CK\" in the last 168 hours.      Imaging: Imaging data reviewed in Epic.    Assessment & Plan:      #Large paraesophageal hernia sp robot-assisted reduction of hiatal hernia, closure of esophageal hiatus and Ulises fundoplication 6/2/2025  -NPO  -IVF  -PPI  -Pain control  -Thoracic surgery following    #History of bladder cancer    #Anxiety  -Hold PTA meds    #Chronic sinusitis  -Flonase    Plan of care  discussed with patient, wife and RN.    Tom Zuniga MD  6/2/2025    The 21st Century Cures Act makes medical notes like these available to patients in the interest of transparency. Please be advised this is a medical document. Medical documents are intended to carry relevant information, facts as evident, and the clinical opinion of the practitioner. The medical note is intended as peer to peer communication and may appear blunt or direct. It is written in medical language and may contain abbreviations or verbiage that are unfamiliar.

## 2025-06-02 NOTE — TELEPHONE ENCOUNTER
Last office visit: 3/25/25   Protocol: pass  Requested medication(s) are due for refill today: yes  Requested medication(s) are on the active medication list same strength, form, dose/ sig: yes  Requested medication(s) are managed by provider: yes  Patient has already received a courtsey refill: no    NOV: none     Asked to Return: 6 months

## 2025-06-02 NOTE — ANESTHESIA POSTPROCEDURE EVALUATION
[facility]    Cruz Krishnan Patient Status:  Outpatient in a Bed   Age/Gender 80 year old male MRN [id number]   Location [facility] POST ANESTHESIA CARE UNIT Attending Triston Hogan Jr., MD   Hosp Day # 0 PCP Tevin Kiser MD       Anesthesia Post-op Note    XI ROBOT-ASSISTED HIATAL HERNIA REPAIR AND FUNDOPLICATION    Procedure Summary       Date: 06/02/25 Room / Location:  MAIN OR 08 /  MAIN OR    Anesthesia Start: 0729 Anesthesia Stop: 1046    Procedure: XI ROBOT-ASSISTED HIATAL HERNIA REPAIR AND FUNDOPLICATION (Abdomen) Diagnosis: (HIATAL HERNIA)    Surgeons: Triston Hogan Jr., MD Anesthesiologist: Jayson Hoffman MD    Anesthesia Type: general ASA Status: 2            Anesthesia Type: general    Vitals Value Taken Time   /95 06/02/25 10:59   Temp 97.8 °F (36.6 °C) 06/02/25 10:44   Pulse 76 06/02/25 11:02   Resp 12 06/02/25 11:02   SpO2 94 % 06/02/25 11:02   Vitals shown include unfiled device data.        Patient Location: PACU    Anesthesia Type: general    Airway Patency: patent and extubated    Postop Pain Control: adequate    Mental Status: mildly sedated but able to meaningfully participate in the post-anesthesia evaluation    Nausea/Vomiting: none    Cardiopulmonary/Hydration status: stable euvolemic    Complications: no apparent anesthesia related complications    Postop vital signs: stable    Comments:   The airway was removed in the procedure area.  Cable monitors were removed, and the patient was transported with observation to the recovery area personally with the OR team.  The patient was responsive in a meaningful way and demonstrated a good airway.  PACU monitors were then applied with device connection to Epic.  Full report signout, including report, identifications, history, procedure, anesthesia course, recovery expectations with chance for questions was provided to a responsible recovery RN.    Dental Exam: Unchanged from Preop    Patient to be discharged  from PACU when criteria met.

## 2025-06-02 NOTE — DISCHARGE INSTRUCTIONS
Thoracic Surgery Post-Operative Appointment     Follow up appointment scheduled: with Dr. Hogan on June 11th at 12:30pm located at the [facility]. Please check in for your appointment at [address].  Thank you.      Thoracic Surgery Discharge Instructions     Diet  Please follow a full liquid diet as discussed with the dietitian until you see us for your follow up appointment.     Pain Management  You will be sent home with a prescription for pain medicine. This will be a liquid form of acetaminophen and hydrocodone. Ice packs can be helpful as well for surface level, skin incision pain.     You may also take liquid tylenol (acetaminophen) and ibuprofen.   -Note that your prescription pain medication contains 325mg of acetaminophen and the max dose for acetaminophen (Tylenol) is 4000 mg per day.  -Unless you have kidney problems, ibuprofen 600 mg every 6 hours can be used along with Tylenol for additional pain control.    Restrictions  Upon discharge home, do not soak in a tub/pool until after your first follow up to see me in the office. You may shower, washing incisions gently with soap and water.    Do not take any large, non-essential pills. For any essential pills larger than an \"Advil\", please crush.     If taking prescription pain medications you may become constipated. Fluids, fiber and over the counter stool softeners are helpful for this.    No heavy lifting, pushing, or pulling. Do not lift anything greater than the weight of a gallon of milk (about 5lbs).     Other than that, no activity restrictions. You should attempt to be as active as possible. What ever you feel up to doing, you can do. Walking is strongly encouraged. You may drive (not within 4 hours of taking prescription pain medications).     Exercises  You will be sent home with your breathing \"toys\" -- like your incentive spirometer. Use this frequently at home. 10 times per hour while awake, if possible. If watching TV, use it at  every commercial break.    Follow-up Appointment  My office will reach out to you regarding a follow up appointment, if not already scheduled. Appointment will be approximately 1-2 weeks after discharge.     If you are experiencing any of these symptoms, please call our office at [phone]. Office hours are Monday through Friday, 8 am to 4:30 pm.  If you are calling the office after hours, please call the Thoracic Surgery On-Call number [phone], and state that you are a patient from [facility].      - Persistent fevers of 101.5 or greater  - Food getting \"stuck\"   - Worsening pain  - Signs of infection in your wound such as drainage, worsening of redness, swelling or     pain.  - Anything else that concerns you.

## 2025-06-03 ENCOUNTER — APPOINTMENT (OUTPATIENT)
Dept: GENERAL RADIOLOGY | Facility: HOSPITAL | Age: 80
End: 2025-06-03
Attending: STUDENT IN AN ORGANIZED HEALTH CARE EDUCATION/TRAINING PROGRAM
Payer: MEDICARE

## 2025-06-03 VITALS
TEMPERATURE: 99 F | SYSTOLIC BLOOD PRESSURE: 145 MMHG | OXYGEN SATURATION: 97 % | HEIGHT: 72 IN | RESPIRATION RATE: 25 BRPM | BODY MASS INDEX: 27.22 KG/M2 | DIASTOLIC BLOOD PRESSURE: 74 MMHG | HEART RATE: 87 BPM | WEIGHT: 201 LBS

## 2025-06-03 LAB
BASOPHILS # BLD AUTO: 0.03 X10(3) UL (ref 0–0.2)
BASOPHILS NFR BLD AUTO: 0.3 %
EOSINOPHIL # BLD AUTO: 0.08 X10(3) UL (ref 0–0.7)
EOSINOPHIL NFR BLD AUTO: 0.8 %
ERYTHROCYTE [DISTWIDTH] IN BLOOD BY AUTOMATED COUNT: 13.2 %
HCT VFR BLD AUTO: 38.6 % (ref 39–53)
HGB BLD-MCNC: 12.8 G/DL (ref 13–17.5)
IMM GRANULOCYTES # BLD AUTO: 0.03 X10(3) UL (ref 0–1)
IMM GRANULOCYTES NFR BLD: 0.3 %
LYMPHOCYTES # BLD AUTO: 1.48 X10(3) UL (ref 1–4)
LYMPHOCYTES NFR BLD AUTO: 14.9 %
MCH RBC QN AUTO: 34.2 PG (ref 26–34)
MCHC RBC AUTO-ENTMCNC: 33.2 G/DL (ref 31–37)
MCV RBC AUTO: 103.2 FL (ref 80–100)
MONOCYTES # BLD AUTO: 0.88 X10(3) UL (ref 0.1–1)
MONOCYTES NFR BLD AUTO: 8.8 %
NEUTROPHILS # BLD AUTO: 7.46 X10 (3) UL (ref 1.5–7.7)
NEUTROPHILS # BLD AUTO: 7.46 X10(3) UL (ref 1.5–7.7)
NEUTROPHILS NFR BLD AUTO: 74.9 %
PLATELET # BLD AUTO: 282 10(3)UL (ref 150–450)
RBC # BLD AUTO: 3.74 X10(6)UL (ref 3.8–5.8)
WBC # BLD AUTO: 10 X10(3) UL (ref 4–11)

## 2025-06-03 PROCEDURE — 99213 OFFICE O/P EST LOW 20 MIN: CPT | Performed by: HOSPITALIST

## 2025-06-03 PROCEDURE — 74240 X-RAY XM UPR GI TRC 1CNTRST: CPT | Performed by: STUDENT IN AN ORGANIZED HEALTH CARE EDUCATION/TRAINING PROGRAM

## 2025-06-03 RX ORDER — OXYCODONE HCL 5 MG/5 ML
5 SOLUTION, ORAL ORAL EVERY 4 HOURS PRN
Refills: 0 | Status: DISCONTINUED | OUTPATIENT
Start: 2025-06-03 | End: 2025-06-03

## 2025-06-03 RX ORDER — OXYCODONE HCL 5 MG/5 ML
5 SOLUTION, ORAL ORAL EVERY 4 HOURS PRN
Qty: 300 ML | Refills: 0 | Status: SHIPPED | OUTPATIENT
Start: 2025-06-03

## 2025-06-03 NOTE — DISCHARGE SUMMARY
[facility]   part of [facility]    Discharge Summary    Cruz Krishnan Patient Status:  Outpatient in a Bed    1945 MRN [id number]   Location [address] Attending Edison Briceno, Triston HUBBARD MD   Hosp Day # 0 PCP Tevin Kiser MD     Date of Admission: 2025 Disposition: Home or Self Care     Date of Discharge: 6/3/2025    Admitting Diagnosis: HIATAL HERNIA    Discharge Diagnosis: Problem List[1]    Reason for Admission: HIATAL HERNIA    Physical Exam:  General: well appearing male in no acute distress  HEENT: Normocephalic, PERRL, EOMI, no scleral icterus  Heart: regular rate and rhythm. No lower extremity edema.  Lungs: Normal respiratory effort. Equal chest rise bilaterally.    Abdomen: Soft, non-distended. Wounds clean, dry and intact.  No erythema, swelling, drainage or other signs of infection  Extremities: No clubbing or cyanosis. No lateralizing weakness  Neuro: No gross cranial nerve defects, no loss of sensation  Psych: oriented to person place and time, normal mood and affect     History of Present Illness: Patient is a 80 year old male admitted for surgery for hiatal hernia on 2025    Hospital Course: Patient underwent surgery on 2025. He did well post-operatively. After an initial stay in the PACU, he went to the general floor. There he was able to get up and move around on their own. On POD1, UGI was negative and patient was advanced to a clear liquid diet. Prior to discharge, patient was tolerating a full liquid diet, pain was controlled, and they were sent home in good condition.    Final Pathology: pending    Consultations: Internal Medicine    Procedures: XI ROBOT-ASSISTED HIATAL HERNIA REPAIR AND FUNDOPLICATION    Complications: none    Discharge Condition: Good    Discharge Medications:      Discharge Medications        START taking these medications        Instructions Prescription details   oxyCODONE 5 MG/5ML Soln  Commonly known as: Roxicodone      Take  5 mL (5 mg total) by mouth every 4 (four) hours as needed for Pain.   Quantity: 300 mL  Refills: 0            CONTINUE taking these medications        Instructions Prescription details   pantoprazole 40 MG Tbec  Commonly known as: Protonix      TAKE 1 TABLET BY MOUTH EVERY DAY IN THE MORNING BEFORE BREAKFAST   Quantity: 90 tablet  Refills: 0     venlafaxine 75 MG Tabs  Commonly known as: Effexor      TAKE 1 TABLET BY MOUTH TWICE A DAY   Quantity: 180 tablet  Refills: 1               Where to Get Your Medications        These medications were sent to [facility]/pharmacy #[id number] - [place], [place] - [address] [phone], [phone]  [address] [facility] [place] [address]      Phone: [phone]   oxyCODONE 5 MG/5ML Soln  pantoprazole 40 MG Tbec         Follow up Visits: Follow-up with Dr. Hogan in 1-2 weeks.     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

## 2025-06-03 NOTE — PROGRESS NOTES
Thoracic Surgery Progress Note     Cruz Krishnan is a 80 year old male. MRN [id number]. Admitted 2025    SUBJECTIVE/24H EVENTS:     No acute events overnight. Denies nausea. Reports pain in bilateral shoulders / neck improved with IV dilaudid. Minimal abdominal pain. + flatus.     Objective:     VITALS:     Temp (24hrs), Av.3 °F (36.8 °C), Min:97.8 °F (36.6 °C), Max:98.7 °F (37.1 °C)   /76 (BP Location: Left arm)   Pulse 62   Temp 98.6 °F (37 °C) (Oral)   Resp 17   Ht 6'   Wt 201 lb   SpO2 96%   BMI 27.26 kg/m²     EXAM:   General: well appearing male in no acute distress  Heart: regular rate and rhythm.   Lungs: Normal respiratory effort. Equal chest rise bilaterally  Incisions: c/d/i   Abdomen: Soft, non-tender, mildly distended.   Extremities: No clubbing or cyanosis. No lateralizing weakness  Neuro: no focal defects  Psych:  oriented to person place and time, normal mood and affect      Intake/Output Summary (Last 24 hours) at 6/3/2025 0807  Last data filed at 6/3/2025 0700  Gross per 24 hour   Intake 2532.97 ml   Output 1665 ml   Net 867.97 ml         MEDS:  Scheduled Medications[1]    LABS:  Lab Results   Component Value Date    WBC 10.0 2025    HGB 12.8 2025    HCT 38.6 2025    .0 2025         Assessment/Plan:     80 year old male 1 day post op from robotic assisted hiatal hernia repair and fundoplication.     -UGI this AM  -NPO until UGI results. If okay, advance diet as tolerated to a full liquid diet  -Stop IVF once tolerating liquids  -Pain control.   -Maintain saturations above 90%.   -Ambulate 3-4 times per day in the halls. Spend majority of day out of bed, in chair. Encouraged cough and IS use 10x hourly.    -Anticipate discharge later today once tolerating a diet.      Patient discussed with Dr. Hogan.     CYNDI ChildC  Thoracic Surgery  Pager: [phone]               [1]    acetaminophen  1,000 mg Intravenous Q6H    heparin  5,000  Units Subcutaneous 2 times per day    pantoprazole  40 mg Intravenous Q24H    fluticasone propionate  1 spray Each Nare BID

## 2025-06-03 NOTE — PLAN OF CARE
Received pt at 1930  Pt AOx4, NSR, RA, VSS  IVF  IV abx & tylenol  IVP dilaudid x1  D/c Planning: Upper GI 6/3  Call light within reach.  Needs currently met

## 2025-06-03 NOTE — DIETARY NOTE
[facility]   part of [facility]   CLINICAL NUTRITION    Cruz Krishnan     Admitting diagnosis:  HIATAL HERNIA    Ht: 182.9 cm (6')  Wt: 91.2 kg (201 lb).   Body mass index is 27.26 kg/m².  IBW: 80.9 kg    Wt Readings from Last 6 Encounters:   06/02/25 91.2 kg (201 lb)   03/14/25 91.7 kg (202 lb 3.2 oz)   07/31/24 91.6 kg (202 lb)   09/03/23 90.7 kg (200 lb)   03/19/20 95.3 kg (210 lb)        Labs/Meds reviewed    Diet:       Procedures    Clear liquid diet Is Patient on Accuchecks? No     Percent Meals Eaten (last 3 days)       None              Pt chart reviewed d/t MD Consult.  Patient reports good appetite at this time.  Pt reports eating 100% of clear liquids at time of visit  Tolerating po diet without diarrhea, emesis, or constipation.   No significant weight changes noted.     PMH includes Bladder CA, GERD. POD#1 for Hernia repair w/ fundoplication.  Pt seen with wife at bedside. Fundoplication diet education provided including full liquids x 1 week.  Encouraged use of ONS to help maintain nutritional status.  Discussed low fiber diet when diet advanced. Pt/ have handouts for questions post discharge. Expect good compliance. All questions answered at time of visit. Anticipated discharge this date    Patient is at low nutrition risk at this time.    Please consult if patient status changes or nutrition issues arise.    Kalani Tompkins RD, LDN, [facility]  Clinical Dietitian  Phone [phone]

## 2025-06-03 NOTE — PLAN OF CARE
Pt discharged home via wheelchari accompanied by his wife. Discharge instructions given with pt verbalizing understanding.

## 2025-06-03 NOTE — PROGRESS NOTES
[facility]   part of [facility]     Hospitalist Progress Note     Cruz Krishnan Patient Status:  Outpatient in a Bed    1945 MRN [id number]   [facility] 7NE-A Attending Edison Briceno, Triston HUBBARD MD   Hosp Day # 0 PCP Tevin Kiser MD     Chief Complaint: Ulises fundoplication     Subjective:   Patient admits to chest discomfort - unchanged. No difficulty breathing. No nausea or vomiting. No flatus or BM.     Current medications:   acetaminophen  1,000 mg Intravenous Q6H    heparin  5,000 Units Subcutaneous 2 times per day    pantoprazole  40 mg Intravenous Q24H    fluticasone propionate  1 spray Each Nare BID       Objective:    Review of Systems:   10 point ROS completed and was negative, except for pertinent positive and negatives stated in subjective.    Vital signs:  Temp:  [97.8 °F (36.6 °C)-98.7 °F (37.1 °C)] 97.8 °F (36.6 °C)  Pulse:  [61-84] 84  Resp:  [11-21] 18  BP: (132-171)/(72-95) 171/89  SpO2:  [92 %-98 %] 93 %  Patient Weight for the past 72 hrs:   Weight   25 0601 201 lb (91.2 kg)     Physical Exam:    General: No acute distress.   Respiratory: Clear to auscultation bilaterally.   Cardiovascular: S1, S2. Regular rate and rhythm. Tenderness to chest wall  Abdomen: Soft, hypoactive  Extremities: No edema.  Neuro: AAOx3    Diagnostic Data:    Labs:  Recent Labs   Lab 25  0929 25  0535   WBC 5.7 10.0   HGB 13.9 12.8*   .0* 103.2*   .0 282.0       Recent Labs   Lab 25  0929   GLU 89   BUN 10   CREATSERUM 0.88   CA 9.2      K 4.0      CO2 27.0       Estimated Creatinine Clearance: 73.5 mL/min (based on SCr of 0.88 mg/dL).    No results for input(s): \"PTP\", \"INR\" in the last 168 hours.         COVID-19 Lab Results    COVID-19  Lab Results   Component Value Date    COVID19 Not Detected 2023    COVID19 Not Detected 2023    COVID19 Not Detected 11/10/2022       Pro-Calcitonin  No results for input(s): \"PCT\" in the last  168 hours.    Cardiac  No results for input(s): \"TROP\", \"PBNP\" in the last 168 hours.    Creatinine Kinase  No results for input(s): \"CK\" in the last 168 hours.    Inflammatory Markers  No results for input(s): \"CRP\", \"NICK\", \"LDH\", \"DDIMER\" in the last 168 hours.    No results for input(s): \"TROP\", \"TROPHS\", \"CK\" in the last 168 hours.    Imaging: Imaging data reviewed in Epic.    Medications:    acetaminophen  1,000 mg Intravenous Q6H    heparin  5,000 Units Subcutaneous 2 times per day    pantoprazole  40 mg Intravenous Q24H    fluticasone propionate  1 spray Each Nare BID       Assessment & Plan:      #Large paraesophageal hernia sp robot-assisted reduction of hiatal hernia, closure of esophageal hiatus and Ulises fundoplication 6/2/2025  -NPO  -IVF  -PPI  -Pain control  -UGI today   -Thoracic surgery following     #History of bladder cancer     #Anxiety  -Hold PTA meds until cleared to resume by Thoracic      #Chronic sinusitis  -Flonase    DVT Px: Heparin     At this point Mr. Krishnan is expected to be discharge to: Home    Plan of care discussed with patient and RN.    Tom Zuniga MD        Supplementary Documentation:   DVT Mechanical Prophylaxis:   SCDs, Early ambuation  DVT Pharmacologic Prophylaxis   Medication    heparin (Porcine) 5000 UNIT/ML injection 5,000 Units                Code Status: Not on file  Shaw: No urinary catheter in place  Shaw Duration (in days):   Central line:    UMA: 6/3/2025

## 2025-06-09 ENCOUNTER — PATIENT MESSAGE (OUTPATIENT)
Dept: FAMILY MEDICINE CLINIC | Facility: CLINIC | Age: 80
End: 2025-06-09

## 2025-06-10 NOTE — PROGRESS NOTES
[place] Thoracic Surgery Post-Op Visit     Mr. Krishnan is a 80 year old male who presents today in follow up after a robotic assisted hiatal hernia repair and fundoplication performed on 6/2/25. He is doing well. He complains of nothing. He denies fevers or chills. No cough. No hemoptysis. No shortness of breath. No worsening pain, swelling, or drainage from wounds. He has been tolerating a full liquid diet without dysphagia. He even had chicken and ground beef the other day without issues. He is still taking his pantoprazole twice daily. No reflux symptoms.  He has been staying active at home.     PMH:  Past Medical History[1]    Meds:  Medications - Current[2]    Vital Signs:    /80 (BP Location: Right arm, Patient Position: Sitting, Cuff Size: adult)   Pulse 64   Temp 97.8 °F (36.6 °C) (Oral)   Resp 16   Wt 87.5 kg (193 lb)   SpO2 96%   BMI 26.18 kg/m²     Physical Exam:    General: Well appearing male in no acute distress  HEENT: Normocephalic, PERRL, EOMI  Heart: Regular rate and rhythm. No murmurs, rubs or gallops. No lower extremity edema.  Lungs: Normal respiratory effort. Clear to ascultation bilaterally.  Abdomen: Soft, Non-tender, non-distended. Incisions are clean, dry and intact. No signs of infection.  Extremities: No clubbing or cyanosis. No lateralizing weakness  Neuro: No gross cranial nerve defects, no loss of sensation  Psych: Oriented to person place and time, normal mood and affect    Pathology:  Final Diagnosis:   Paraesophageal hernia, excision:  -Compatible with hernia sac.  -Negative for malignancy.     Assessment:    Mr. Krishnan is a 80 year old male who presents today in follow up after a robotic assisted large, symptomatic  paraesophageal hernia on June 2nd, 2025. He is doing quite well. Mr. Krishnan has no apparent complications from the surgery. He has been tolerating a full liquid diet without dysphagia so we will advance him to soft diet today. Recommend follow up with  Dr. Webb to discuss weaning his pantoprazole. We will see him back for a follow up in one month.     Plan:  Encouraged continued exercise and incentive spirometer use.  No heavy lifting, pushing or pulling for once month after surgery.   Soft diet  Follow up again in one month    Triston Hogan M.D.  Thoracic Surgery  Pager: [phone]          [1]   Past Medical History:   Abdominal hernia    Abdominal pain    Anesthesia complication    difficult to wake up    Anxiety    Arthritis    Back pain    Belching    Calculus of kidney    Cancer (HCC)    bladder diagnosed 1972- chemo and radiation until  1977    Cataract    Chronic cough    Diarrhea, unspecified    Exposure to medical diagnostic radiation    COMPLETED 1977    Fatigue    Heartburn    Indigestion    Osteoarthritis    Pain in joints    Personal history of antineoplastic chemotherapy    COMPLETED 1977    Shortness of breath    Sleep disturbance    Stress    Uncomfortable fullness after meals    Visual impairment    glasses    Vomiting    Wears glasses    Weight gain   [2]   Current Outpatient Medications:     oxyCODONE 5 MG/5ML Oral Solution, Take 5 mL (5 mg total) by mouth every 4 (four) hours as needed for Pain., Disp: 300 mL, Rfl: 0    PANTOPRAZOLE 40 MG Oral Tab EC, TAKE 1 TABLET BY MOUTH EVERY DAY IN THE MORNING BEFORE BREAKFAST, Disp: 90 tablet, Rfl: 0    venlafaxine 75 MG Oral Tab, TAKE 1 TABLET BY MOUTH TWICE A DAY, Disp: 180 tablet, Rfl: 1

## 2025-06-11 ENCOUNTER — OFFICE VISIT (OUTPATIENT)
Dept: SURGERY | Facility: CLINIC | Age: 80
End: 2025-06-11
Payer: COMMERCIAL

## 2025-06-11 VITALS
TEMPERATURE: 98 F | SYSTOLIC BLOOD PRESSURE: 121 MMHG | BODY MASS INDEX: 26 KG/M2 | OXYGEN SATURATION: 96 % | HEART RATE: 64 BPM | WEIGHT: 193 LBS | RESPIRATION RATE: 16 BRPM | DIASTOLIC BLOOD PRESSURE: 80 MMHG

## 2025-06-11 DIAGNOSIS — K44.9 HIATAL HERNIA: Primary | ICD-10-CM

## 2025-07-16 ENCOUNTER — OFFICE VISIT (OUTPATIENT)
Dept: SURGERY | Facility: CLINIC | Age: 80
End: 2025-07-16
Payer: COMMERCIAL

## 2025-07-16 VITALS — HEART RATE: 75 BPM | OXYGEN SATURATION: 94 % | SYSTOLIC BLOOD PRESSURE: 135 MMHG | DIASTOLIC BLOOD PRESSURE: 84 MMHG

## 2025-07-16 DIAGNOSIS — K44.9 HIATAL HERNIA: Primary | ICD-10-CM

## (undated) DEVICE — TROCAR: Brand: KII FIOS FIRST ENTRY

## (undated) DEVICE — MEGA SUTURECUT ND: Brand: ENDOWRIST

## (undated) DEVICE — SUT ETHBND XL 0 36IN SH NABSRB GRN 26MM 1/2

## (undated) DEVICE — DISPOSABLE GRASPER: Brand: EPIX LAPAROSCOPIC GRASPER

## (undated) DEVICE — COLUMN DRAPE

## (undated) DEVICE — CADIERE FORCEPS: Brand: ENDOWRIST

## (undated) DEVICE — ROBOTIC GENERAL: Brand: MEDLINE INDUSTRIES, INC.

## (undated) DEVICE — GLOVE SUR 7.5 SENSICARE PI PIP CRM PWD F

## (undated) DEVICE — ENDOPATH ULTRA VERESS INSUFFLATION NEEDLES WITH LUER LOCK CONNECTORS: Brand: ENDOPATH

## (undated) DEVICE — 3M™ IOBAN™ 2 ANTIMICROBIAL INCISE DRAPE 6648EZ: Brand: IOBAN™ 2

## (undated) DEVICE — LAPAROVUE VISIBILITY SYSTEM LAPAROSCOPIC SOLUTIONS: Brand: LAPAROVUE

## (undated) DEVICE — SOLUTION IRRIG 1000ML 0.9% NACL USP BTL

## (undated) DEVICE — PAIN TRAY: Brand: MEDLINE INDUSTRIES, INC.

## (undated) DEVICE — BANDAGE ADH 1INX3IN NAT FAB N ADH PD CURAD

## (undated) DEVICE — 2, DISPOSABLE SUCTION/IRRIGATOR WITHOUT DISPOSABLE TIP: Brand: STRYKEFLOW

## (undated) DEVICE — GLOVE SUR 6 SENSICARE PI PIP CRM PWD F

## (undated) DEVICE — BLADELESS OBTURATOR: Brand: WECK VISTA

## (undated) DEVICE — SKIN REG/FINE DUAL MARKER, RULER, LABELS: Brand: MEDLINE

## (undated) DEVICE — SUTURE ETHBND XL 2-0 30IN NABSRB GRN 26MM SH 1/2 CIR

## (undated) DEVICE — REMOVER LOT 4OZ N IRRIG UNSCNT SFT MOIST LIQ

## (undated) DEVICE — NEEDLE SPNL 22GA L5IN BLK HUB QNCKE BVL DISP

## (undated) DEVICE — LARGE NEEDLE DRIVER: Brand: ENDOWRIST

## (undated) DEVICE — ANCHOR TISSUE RETRIEVAL SYSTEM, BAG SIZE 125 ML, PORT SIZE 8 MM: Brand: ANCHOR TISSUE RETRIEVAL SYSTEM

## (undated) DEVICE — GLOVE SUR 6.5 SENSICARE PIP WHT PWD F

## (undated) DEVICE — GLOVE SUR 7.5 SENSICARE PIP WHT PWD F

## (undated) DEVICE — SUT MCRYL 4-0 18IN PS-2 ABSRB UD 19MM 3/8 CIR

## (undated) DEVICE — SEAL

## (undated) DEVICE — COVER,LIGHT,CAMERA,HARD,1/PK,STRL: Brand: MEDLINE

## (undated) DEVICE — STRL PENROSE DRAIN 18" X 1/2": Brand: CARDINAL HEALTH

## (undated) DEVICE — ARM DRAPE

## (undated) DEVICE — VESSEL SEALER EXTEND: Brand: ENDOWRIST

## (undated) DEVICE — 40580 - THE PINK PAD - ADVANCED TRENDELENBURG POSITIONING KIT: Brand: 40580 - THE PINK PAD - ADVANCED TRENDELENBURG POSITIONING KIT

## (undated) DEVICE — #15 STERILE STAINLESS BLADE: Brand: STERILE STAINLESS BLADES

## (undated) DEVICE — TIP-UP FENESTRATED GRASPER: Brand: ENDOWRIST

## (undated) DEVICE — TRAY CATH 16FR F INCL BARDX IC COMPLT CARE

## (undated) NOTE — LETTER
[place] Pre-Admission Testing Department  Phone: [phone]  Right Fax: [phone]  CLARIFICATION FOR E-SSS    Sent By:    [id number] Date: May 20, 2025     Patient Name: Cruz Krishnan  Surgery Date: 2025  CSN: [id number]     Medical Record: [id number]  : 1945      Age: 80 year old        Sex: male    Surgeons and Role:     * Edison Briceno, Triston HUBBARD MD - Primary  FAX: [phone]  Procedure Comments:  XI ROBOT-ASSISTED HIATAL HERNIA REPAIR AND FUNDOPLICATION, POSSIBLE MESH PLACEMENT , POSSIBLE VALENTIN GASTROPLASTY. ESOPHAGAGASTROSCOPY    YOU SHOULD RECEIVE 1 PAGES (INCLUDING COVER SHEET)    Please note that clarification is needed on the Electronic-Surgery Scheduling Sheet (E-SSS)  the original is included with this letter. Please have physician review and make changes on the faxed copy of the E-SSS.    PER PATIENT PROCEDURE SHOULD BE OUTPATIENT IN A BED- CURRENTLY LISTED OUTPATIENT. PLEASE CLARIFY AND SEND REVISED SCHEDULING SHEET IF NECESSARY     ALL CHANGES MUST BE DOCUMENTED ON THE E-SSS AND SIGNED BY THE PHYSICIAN    After physician has made the changes and signed the E-SSS please fax it to [phone] and these changes will then be made in Epic by the OR schedulers.     If you have any questions please call Pre-Admission Testing at [phone]    Thank you,    Pre-Admission Testing    [address]  [address]  Phone: [phone]  Fax: [phone]  [email or web address]    STATEMENT OF CONFIDENTIALITY: This transmittal is intended only for the use of the individual entity to which is addressed and may contain information that is privileged and confidential. information contained. If the reader of this message IS NOT the intended recipient, you are hereby notified that any disclosure, distribution or copying of this information is strictly prohibited. If you have received this transmission in error, please notify us immediately by telephone and  return the original documents to us at the above address via the [place] Postal [employer]. Thank you.

## (undated) NOTE — Clinical Note
Mr. Krishnan is doing well.  Please see attached note for an update on his operation and pathology. Please feel free to call me with any questions at [phone].  Thank you,  Jaden Hogan Thoracic Surgery

## (undated) NOTE — ED AVS SNAPSHOT
Zachchana Paula   MRN: [id number]    Department:  [facility] Emergency Department   Date of Visit:  3/19/2020           Disclosure     Insurance plans vary and the physician(s) referred by the ER may not be covered by your plan.  Please contact y tell this physician (or your personal doctor if your instructions are to return to your personal doctor) about any new or lasting problems. The primary care or specialist physician will see patients referred from the [facility] Emergency Department.  Nemo Jimenez

## (undated) NOTE — Clinical Note
I saw your patient, Mr. Krishnan.  Please see attached note for my assessment and plans moving forward.  Thank you for involving me in his care.  Please feel free to call me with any questions at [phone]  Jaden Hogan Thoracic Surgery